# Patient Record
Sex: MALE | Race: WHITE | Employment: OTHER | URBAN - NONMETROPOLITAN AREA
[De-identification: names, ages, dates, MRNs, and addresses within clinical notes are randomized per-mention and may not be internally consistent; named-entity substitution may affect disease eponyms.]

---

## 2017-01-06 ENCOUNTER — HOSPITAL ENCOUNTER (OUTPATIENT)
Dept: GENERAL RADIOLOGY | Age: 67
Discharge: HOME | End: 2017-01-06
Payer: MEDICARE

## 2017-01-06 DIAGNOSIS — M79.671 RIGHT FOOT PAIN: ICD-10-CM

## 2017-01-06 PROCEDURE — 73630 X-RAY EXAM OF FOOT: CPT

## 2018-11-05 LAB
CHOLESTEROL, TOTAL: 183 MG/DL (ref 160–199)
HDLC SERPL-MCNC: 52 MG/DL (ref 55–121)
LDL CHOLESTEROL CALCULATED: 79 MG/DL
TRIGL SERPL-MCNC: 261 MG/DL (ref 0–149)

## 2019-03-07 ENCOUNTER — HOSPITAL ENCOUNTER (OUTPATIENT)
Dept: GENERAL RADIOLOGY | Age: 69
Discharge: HOME | End: 2019-03-07
Payer: MEDICARE

## 2019-03-07 DIAGNOSIS — M25.561 RIGHT KNEE PAIN, UNSPECIFIED CHRONICITY: ICD-10-CM

## 2019-03-07 PROCEDURE — 73562 X-RAY EXAM OF KNEE 3: CPT

## 2019-03-14 LAB
ALBUMIN SERPL-MCNC: 4.4 G/DL (ref 3.5–5.2)
ALP BLD-CCNC: 47 U/L (ref 40–130)
ALT SERPL-CCNC: 27 U/L (ref 5–41)
ANION GAP SERPL CALCULATED.3IONS-SCNC: 13 MMOL/L (ref 7–19)
AST SERPL-CCNC: 26 U/L (ref 5–40)
BASOPHILS ABSOLUTE: 0.1 K/UL (ref 0–0.2)
BASOPHILS RELATIVE PERCENT: 1.1 % (ref 0–1)
BILIRUB SERPL-MCNC: 0.8 MG/DL (ref 0.2–1.2)
BUN BLDV-MCNC: 14 MG/DL (ref 8–23)
CALCIUM SERPL-MCNC: 9.1 MG/DL (ref 8.8–10.2)
CHLORIDE BLD-SCNC: 101 MMOL/L (ref 98–111)
CHOLESTEROL, TOTAL: 177 MG/DL (ref 160–199)
CO2: 24 MMOL/L (ref 22–29)
CREAT SERPL-MCNC: 1.1 MG/DL (ref 0.5–1.2)
EOSINOPHILS ABSOLUTE: 0.1 K/UL (ref 0–0.6)
EOSINOPHILS RELATIVE PERCENT: 1.2 % (ref 0–5)
GFR NON-AFRICAN AMERICAN: >60
GLUCOSE BLD-MCNC: 92 MG/DL (ref 74–109)
HCT VFR BLD CALC: 44.8 % (ref 42–52)
HDLC SERPL-MCNC: 50 MG/DL (ref 55–121)
HEMOGLOBIN: 14.9 G/DL (ref 14–18)
LDL CHOLESTEROL CALCULATED: 74 MG/DL
LYMPHOCYTES ABSOLUTE: 3 K/UL (ref 1.1–4.5)
LYMPHOCYTES RELATIVE PERCENT: 36.4 % (ref 20–40)
MCH RBC QN AUTO: 30.3 PG (ref 27–31)
MCHC RBC AUTO-ENTMCNC: 33.3 G/DL (ref 33–37)
MCV RBC AUTO: 91.2 FL (ref 80–94)
MONOCYTES ABSOLUTE: 0.9 K/UL (ref 0–0.9)
MONOCYTES RELATIVE PERCENT: 11.2 % (ref 0–10)
NEUTROPHILS ABSOLUTE: 4.1 K/UL (ref 1.5–7.5)
NEUTROPHILS RELATIVE PERCENT: 49.5 % (ref 50–65)
PDW BLD-RTO: 13.2 % (ref 11.5–14.5)
PLATELET # BLD: 259 K/UL (ref 130–400)
PMV BLD AUTO: 9.8 FL (ref 9.4–12.4)
POTASSIUM SERPL-SCNC: 4.4 MMOL/L (ref 3.5–5)
PROSTATE SPECIFIC ANTIGEN: 0.38 NG/ML (ref 0–4)
RBC # BLD: 4.91 M/UL (ref 4.7–6.1)
SODIUM BLD-SCNC: 138 MMOL/L (ref 136–145)
TOTAL PROTEIN: 7.2 G/DL (ref 6.6–8.7)
TRIGL SERPL-MCNC: 266 MG/DL (ref 0–149)
TSH SERPL DL<=0.05 MIU/L-ACNC: 4.15 UIU/ML (ref 0.27–4.2)
WBC # BLD: 8.4 K/UL (ref 4.8–10.8)

## 2019-05-22 ENCOUNTER — HOSPITAL ENCOUNTER (OUTPATIENT)
Dept: VASCULAR LAB | Age: 69
Discharge: HOME | End: 2019-05-22
Payer: MEDICARE

## 2019-05-22 ENCOUNTER — HOSPITAL ENCOUNTER (OUTPATIENT)
Dept: MRI IMAGING | Age: 69
Discharge: HOME | End: 2019-05-22
Payer: MEDICARE

## 2019-05-22 DIAGNOSIS — R42 DIZZINESS: ICD-10-CM

## 2019-05-22 DIAGNOSIS — R42 DIZZINESS: Primary | ICD-10-CM

## 2019-05-22 DIAGNOSIS — R41.0 CONFUSION: ICD-10-CM

## 2019-05-22 LAB
GFR NON-AFRICAN AMERICAN: >60
PERFORMED ON: NORMAL
POC CREATININE: 1 MG/DL (ref 0.3–1.3)
POC SAMPLE TYPE: NORMAL

## 2019-05-22 PROCEDURE — 70553 MRI BRAIN STEM W/O & W/DYE: CPT

## 2019-05-22 PROCEDURE — A9577 INJ MULTIHANCE: HCPCS | Performed by: FAMILY MEDICINE

## 2019-05-22 PROCEDURE — 82565 ASSAY OF CREATININE: CPT

## 2019-05-22 PROCEDURE — 6360000004 HC RX CONTRAST MEDICATION: Performed by: FAMILY MEDICINE

## 2019-05-22 PROCEDURE — 93880 EXTRACRANIAL BILAT STUDY: CPT

## 2019-05-22 RX ADMIN — GADOBENATE DIMEGLUMINE 19 ML: 529 INJECTION, SOLUTION INTRAVENOUS at 14:46

## 2020-03-03 ENCOUNTER — CONSULT (OUTPATIENT)
Dept: ONCOLOGY | Facility: CLINIC | Age: 70
End: 2020-03-03

## 2020-03-03 ENCOUNTER — APPOINTMENT (OUTPATIENT)
Dept: LAB | Facility: HOSPITAL | Age: 70
End: 2020-03-03

## 2020-03-03 VITALS
HEART RATE: 77 BPM | OXYGEN SATURATION: 97 % | BODY MASS INDEX: 30.81 KG/M2 | RESPIRATION RATE: 18 BRPM | SYSTOLIC BLOOD PRESSURE: 146 MMHG | TEMPERATURE: 98 F | WEIGHT: 208 LBS | DIASTOLIC BLOOD PRESSURE: 78 MMHG | HEIGHT: 69 IN

## 2020-03-03 DIAGNOSIS — I15.9 SECONDARY HYPERTENSION: ICD-10-CM

## 2020-03-03 DIAGNOSIS — D04.61 SQUAMOUS CELL CARCINOMA IN SITU (SCCIS) OF SKIN OF RIGHT FOREARM: Primary | ICD-10-CM

## 2020-03-03 LAB
ALBUMIN SERPL-MCNC: 4.6 G/DL (ref 3.5–5.2)
ALBUMIN/GLOB SERPL: 1.5 G/DL
ALP SERPL-CCNC: 52 U/L (ref 39–117)
ALT SERPL W P-5'-P-CCNC: 21 U/L (ref 1–41)
ANION GAP SERPL CALCULATED.3IONS-SCNC: 14 MMOL/L (ref 5–15)
AST SERPL-CCNC: 25 U/L (ref 1–40)
BASOPHILS # BLD AUTO: 0.08 10*3/MM3 (ref 0–0.2)
BASOPHILS NFR BLD AUTO: 1.1 % (ref 0–1.5)
BILIRUB SERPL-MCNC: 0.5 MG/DL (ref 0.2–1.2)
BUN BLD-MCNC: 11 MG/DL (ref 8–23)
BUN/CREAT SERPL: 12.2 (ref 7–25)
CALCIUM SPEC-SCNC: 9.6 MG/DL (ref 8.6–10.5)
CHLORIDE SERPL-SCNC: 100 MMOL/L (ref 98–107)
CO2 SERPL-SCNC: 26 MMOL/L (ref 22–29)
CREAT BLD-MCNC: 0.9 MG/DL (ref 0.76–1.27)
DEPRECATED RDW RBC AUTO: 42.8 FL (ref 37–54)
EOSINOPHIL # BLD AUTO: 0.08 10*3/MM3 (ref 0–0.4)
EOSINOPHIL NFR BLD AUTO: 1.1 % (ref 0.3–6.2)
ERYTHROCYTE [DISTWIDTH] IN BLOOD BY AUTOMATED COUNT: 13.3 % (ref 12.3–15.4)
GFR SERPL CREATININE-BSD FRML MDRD: 83 ML/MIN/1.73
GLOBULIN UR ELPH-MCNC: 3 GM/DL
GLUCOSE BLD-MCNC: 100 MG/DL (ref 65–99)
HCT VFR BLD AUTO: 43 % (ref 37.5–51)
HGB BLD-MCNC: 15 G/DL (ref 13–17.7)
IMM GRANULOCYTES # BLD AUTO: 0.02 10*3/MM3 (ref 0–0.05)
IMM GRANULOCYTES NFR BLD AUTO: 0.3 % (ref 0–0.5)
LYMPHOCYTES # BLD AUTO: 2.99 10*3/MM3 (ref 0.7–3.1)
LYMPHOCYTES NFR BLD AUTO: 39.6 % (ref 19.6–45.3)
MCH RBC QN AUTO: 30.7 PG (ref 26.6–33)
MCHC RBC AUTO-ENTMCNC: 34.9 G/DL (ref 31.5–35.7)
MCV RBC AUTO: 87.9 FL (ref 79–97)
MONOCYTES # BLD AUTO: 0.91 10*3/MM3 (ref 0.1–0.9)
MONOCYTES NFR BLD AUTO: 12.1 % (ref 5–12)
NEUTROPHILS # BLD AUTO: 3.47 10*3/MM3 (ref 1.7–7)
NEUTROPHILS NFR BLD AUTO: 45.8 % (ref 42.7–76)
NRBC BLD AUTO-RTO: 0 /100 WBC (ref 0–0.2)
PLATELET # BLD AUTO: 265 10*3/MM3 (ref 140–450)
PMV BLD AUTO: 9.5 FL (ref 6–12)
POTASSIUM BLD-SCNC: 4.1 MMOL/L (ref 3.5–5.2)
PROT SERPL-MCNC: 7.6 G/DL (ref 6–8.5)
RBC # BLD AUTO: 4.89 10*6/MM3 (ref 4.14–5.8)
SODIUM BLD-SCNC: 140 MMOL/L (ref 136–145)
WBC NRBC COR # BLD: 7.55 10*3/MM3 (ref 3.4–10.8)

## 2020-03-03 PROCEDURE — 36415 COLL VENOUS BLD VENIPUNCTURE: CPT | Performed by: INTERNAL MEDICINE

## 2020-03-03 PROCEDURE — 85025 COMPLETE CBC W/AUTO DIFF WBC: CPT | Performed by: INTERNAL MEDICINE

## 2020-03-03 PROCEDURE — 80053 COMPREHEN METABOLIC PANEL: CPT | Performed by: INTERNAL MEDICINE

## 2020-03-03 PROCEDURE — 99204 OFFICE O/P NEW MOD 45 MIN: CPT | Performed by: INTERNAL MEDICINE

## 2020-04-20 RX ORDER — LISINOPRIL 5 MG/1
5 TABLET ORAL 2 TIMES DAILY
Status: ON HOLD | COMMUNITY
End: 2020-04-23

## 2020-04-23 ENCOUNTER — ANESTHESIA EVENT (OUTPATIENT)
Dept: PERIOP | Facility: HOSPITAL | Age: 70
End: 2020-04-23

## 2020-04-23 ENCOUNTER — HOSPITAL ENCOUNTER (OUTPATIENT)
Facility: HOSPITAL | Age: 70
Setting detail: HOSPITAL OUTPATIENT SURGERY
Discharge: HOME | End: 2020-04-23
Attending: SPECIALIST | Admitting: SPECIALIST

## 2020-04-23 ENCOUNTER — ANESTHESIA (OUTPATIENT)
Dept: PERIOP | Facility: HOSPITAL | Age: 70
End: 2020-04-23

## 2020-04-23 VITALS
TEMPERATURE: 97.9 F | RESPIRATION RATE: 20 BRPM | WEIGHT: 197.75 LBS | SYSTOLIC BLOOD PRESSURE: 150 MMHG | HEART RATE: 63 BPM | HEIGHT: 69 IN | BODY MASS INDEX: 29.29 KG/M2 | DIASTOLIC BLOOD PRESSURE: 65 MMHG | OXYGEN SATURATION: 98 %

## 2020-04-23 DIAGNOSIS — C44.622 SQUAMOUS CELL CANCER OF SKIN OF FOREARM, RIGHT: ICD-10-CM

## 2020-04-23 LAB
ALBUMIN SERPL-MCNC: 4.5 G/DL (ref 3.5–5.2)
ALBUMIN/GLOB SERPL: 1.7 G/DL
ALP SERPL-CCNC: 56 U/L (ref 39–117)
ALT SERPL W P-5'-P-CCNC: 15 U/L (ref 1–41)
ANION GAP SERPL CALCULATED.3IONS-SCNC: 12 MMOL/L (ref 5–15)
AST SERPL-CCNC: 18 U/L (ref 1–40)
BASOPHILS # BLD AUTO: 0.07 10*3/MM3 (ref 0–0.2)
BASOPHILS NFR BLD AUTO: 1.1 % (ref 0–1.5)
BILIRUB SERPL-MCNC: 0.9 MG/DL (ref 0.2–1.2)
BUN BLD-MCNC: 10 MG/DL (ref 8–23)
BUN/CREAT SERPL: 10.6 (ref 7–25)
CALCIUM SPEC-SCNC: 9.3 MG/DL (ref 8.6–10.5)
CHLORIDE SERPL-SCNC: 102 MMOL/L (ref 98–107)
CO2 SERPL-SCNC: 26 MMOL/L (ref 22–29)
CREAT BLD-MCNC: 0.94 MG/DL (ref 0.76–1.27)
DEPRECATED RDW RBC AUTO: 43.8 FL (ref 37–54)
EOSINOPHIL # BLD AUTO: 0.07 10*3/MM3 (ref 0–0.4)
EOSINOPHIL NFR BLD AUTO: 1.1 % (ref 0.3–6.2)
ERYTHROCYTE [DISTWIDTH] IN BLOOD BY AUTOMATED COUNT: 13.5 % (ref 12.3–15.4)
GFR SERPL CREATININE-BSD FRML MDRD: 79 ML/MIN/1.73
GLOBULIN UR ELPH-MCNC: 2.6 GM/DL
GLUCOSE BLD-MCNC: 96 MG/DL (ref 65–99)
HCT VFR BLD AUTO: 45 % (ref 37.5–51)
HGB BLD-MCNC: 15.3 G/DL (ref 13–17.7)
IMM GRANULOCYTES # BLD AUTO: 0.01 10*3/MM3 (ref 0–0.05)
IMM GRANULOCYTES NFR BLD AUTO: 0.2 % (ref 0–0.5)
LYMPHOCYTES # BLD AUTO: 2.1 10*3/MM3 (ref 0.7–3.1)
LYMPHOCYTES NFR BLD AUTO: 32.7 % (ref 19.6–45.3)
MCH RBC QN AUTO: 30.6 PG (ref 26.6–33)
MCHC RBC AUTO-ENTMCNC: 34 G/DL (ref 31.5–35.7)
MCV RBC AUTO: 90 FL (ref 79–97)
MONOCYTES # BLD AUTO: 0.83 10*3/MM3 (ref 0.1–0.9)
MONOCYTES NFR BLD AUTO: 12.9 % (ref 5–12)
NEUTROPHILS # BLD AUTO: 3.35 10*3/MM3 (ref 1.7–7)
NEUTROPHILS NFR BLD AUTO: 52 % (ref 42.7–76)
NRBC BLD AUTO-RTO: 0 /100 WBC (ref 0–0.2)
PLATELET # BLD AUTO: 233 10*3/MM3 (ref 140–450)
PMV BLD AUTO: 9.7 FL (ref 6–12)
POTASSIUM BLD-SCNC: 4.1 MMOL/L (ref 3.5–5.2)
PROT SERPL-MCNC: 7.1 G/DL (ref 6–8.5)
RBC # BLD AUTO: 5 10*6/MM3 (ref 4.14–5.8)
SODIUM BLD-SCNC: 140 MMOL/L (ref 136–145)
WBC NRBC COR # BLD: 6.43 10*3/MM3 (ref 3.4–10.8)

## 2020-04-23 PROCEDURE — 93010 ELECTROCARDIOGRAM REPORT: CPT | Performed by: INTERNAL MEDICINE

## 2020-04-23 PROCEDURE — 25010000002 ONDANSETRON PER 1 MG: Performed by: NURSE ANESTHETIST, CERTIFIED REGISTERED

## 2020-04-23 PROCEDURE — 25010000002 FENTANYL CITRATE (PF) 100 MCG/2ML SOLUTION: Performed by: NURSE ANESTHETIST, CERTIFIED REGISTERED

## 2020-04-23 PROCEDURE — 85025 COMPLETE CBC W/AUTO DIFF WBC: CPT | Performed by: SPECIALIST

## 2020-04-23 PROCEDURE — 88305 TISSUE EXAM BY PATHOLOGIST: CPT | Performed by: SPECIALIST

## 2020-04-23 PROCEDURE — 25010000002 DEXAMETHASONE PER 1 MG: Performed by: ANESTHESIOLOGY

## 2020-04-23 PROCEDURE — 93005 ELECTROCARDIOGRAM TRACING: CPT | Performed by: SPECIALIST

## 2020-04-23 PROCEDURE — 25010000002 PROPOFOL 10 MG/ML EMULSION: Performed by: NURSE ANESTHETIST, CERTIFIED REGISTERED

## 2020-04-23 PROCEDURE — 80053 COMPREHEN METABOLIC PANEL: CPT | Performed by: SPECIALIST

## 2020-04-23 RX ORDER — SODIUM CHLORIDE, SODIUM LACTATE, POTASSIUM CHLORIDE, CALCIUM CHLORIDE 600; 310; 30; 20 MG/100ML; MG/100ML; MG/100ML; MG/100ML
100 INJECTION, SOLUTION INTRAVENOUS CONTINUOUS
Status: DISCONTINUED | OUTPATIENT
Start: 2020-04-23 | End: 2020-04-23 | Stop reason: HOSPADM

## 2020-04-23 RX ORDER — LABETALOL HYDROCHLORIDE 5 MG/ML
5 INJECTION, SOLUTION INTRAVENOUS
Status: CANCELLED | OUTPATIENT
Start: 2020-04-23

## 2020-04-23 RX ORDER — ACETAMINOPHEN 500 MG
1000 TABLET ORAL ONCE
Status: COMPLETED | OUTPATIENT
Start: 2020-04-23 | End: 2020-04-23

## 2020-04-23 RX ORDER — SODIUM CHLORIDE 0.9 % (FLUSH) 0.9 %
3 SYRINGE (ML) INJECTION EVERY 12 HOURS SCHEDULED
Status: DISCONTINUED | OUTPATIENT
Start: 2020-04-23 | End: 2020-04-23 | Stop reason: HOSPADM

## 2020-04-23 RX ORDER — OXYCODONE HYDROCHLORIDE AND ACETAMINOPHEN 5; 325 MG/1; MG/1
1-2 TABLET ORAL EVERY 4 HOURS PRN
Qty: 30 TABLET | Refills: 0 | Status: SHIPPED | OUTPATIENT
Start: 2020-04-23

## 2020-04-23 RX ORDER — FLUMAZENIL 0.1 MG/ML
0.2 INJECTION INTRAVENOUS AS NEEDED
Status: CANCELLED | OUTPATIENT
Start: 2020-04-23

## 2020-04-23 RX ORDER — OXYCODONE AND ACETAMINOPHEN 7.5; 325 MG/1; MG/1
1 TABLET ORAL EVERY 4 HOURS PRN
Status: CANCELLED | OUTPATIENT
Start: 2020-04-23 | End: 2020-05-03

## 2020-04-23 RX ORDER — ONDANSETRON 2 MG/ML
4 INJECTION INTRAMUSCULAR; INTRAVENOUS ONCE AS NEEDED
Status: CANCELLED | OUTPATIENT
Start: 2020-04-23

## 2020-04-23 RX ORDER — LIDOCAINE HYDROCHLORIDE 10 MG/ML
0.5 INJECTION, SOLUTION EPIDURAL; INFILTRATION; INTRACAUDAL; PERINEURAL ONCE AS NEEDED
Status: DISCONTINUED | OUTPATIENT
Start: 2020-04-23 | End: 2020-04-23 | Stop reason: HOSPADM

## 2020-04-23 RX ORDER — OXYCODONE HYDROCHLORIDE AND ACETAMINOPHEN 5; 325 MG/1; MG/1
1 TABLET ORAL ONCE AS NEEDED
Status: CANCELLED | OUTPATIENT
Start: 2020-04-23

## 2020-04-23 RX ORDER — DEXAMETHASONE SODIUM PHOSPHATE 4 MG/ML
4 INJECTION, SOLUTION INTRA-ARTICULAR; INTRALESIONAL; INTRAMUSCULAR; INTRAVENOUS; SOFT TISSUE ONCE AS NEEDED
Status: COMPLETED | OUTPATIENT
Start: 2020-04-23 | End: 2020-04-23

## 2020-04-23 RX ORDER — FENTANYL CITRATE 50 UG/ML
INJECTION, SOLUTION INTRAMUSCULAR; INTRAVENOUS AS NEEDED
Status: DISCONTINUED | OUTPATIENT
Start: 2020-04-23 | End: 2020-04-23 | Stop reason: SURG

## 2020-04-23 RX ORDER — SODIUM CHLORIDE 0.9 % (FLUSH) 0.9 %
3 SYRINGE (ML) INJECTION AS NEEDED
Status: DISCONTINUED | OUTPATIENT
Start: 2020-04-23 | End: 2020-04-23 | Stop reason: HOSPADM

## 2020-04-23 RX ORDER — BUPIVACAINE HYDROCHLORIDE AND EPINEPHRINE 2.5; 5 MG/ML; UG/ML
INJECTION, SOLUTION INFILTRATION; PERINEURAL AS NEEDED
Status: DISCONTINUED | OUTPATIENT
Start: 2020-04-23 | End: 2020-04-23 | Stop reason: HOSPADM

## 2020-04-23 RX ORDER — FENTANYL CITRATE 50 UG/ML
25 INJECTION, SOLUTION INTRAMUSCULAR; INTRAVENOUS
Status: CANCELLED | OUTPATIENT
Start: 2020-04-23

## 2020-04-23 RX ORDER — PROPOFOL 10 MG/ML
VIAL (ML) INTRAVENOUS AS NEEDED
Status: DISCONTINUED | OUTPATIENT
Start: 2020-04-23 | End: 2020-04-23 | Stop reason: SURG

## 2020-04-23 RX ORDER — SODIUM CHLORIDE, SODIUM LACTATE, POTASSIUM CHLORIDE, CALCIUM CHLORIDE 600; 310; 30; 20 MG/100ML; MG/100ML; MG/100ML; MG/100ML
1000 INJECTION, SOLUTION INTRAVENOUS CONTINUOUS
Status: DISCONTINUED | OUTPATIENT
Start: 2020-04-23 | End: 2020-04-23 | Stop reason: HOSPADM

## 2020-04-23 RX ORDER — MAGNESIUM HYDROXIDE 1200 MG/15ML
LIQUID ORAL AS NEEDED
Status: DISCONTINUED | OUTPATIENT
Start: 2020-04-23 | End: 2020-04-23 | Stop reason: HOSPADM

## 2020-04-23 RX ORDER — NALOXONE HCL 0.4 MG/ML
0.4 VIAL (ML) INJECTION AS NEEDED
Status: CANCELLED | OUTPATIENT
Start: 2020-04-23

## 2020-04-23 RX ORDER — OXYCODONE HYDROCHLORIDE AND ACETAMINOPHEN 5; 325 MG/1; MG/1
1 TABLET ORAL ONCE AS NEEDED
Status: DISCONTINUED | OUTPATIENT
Start: 2020-04-23 | End: 2020-04-23 | Stop reason: HOSPADM

## 2020-04-23 RX ORDER — ONDANSETRON 2 MG/ML
INJECTION INTRAMUSCULAR; INTRAVENOUS AS NEEDED
Status: DISCONTINUED | OUTPATIENT
Start: 2020-04-23 | End: 2020-04-23 | Stop reason: SURG

## 2020-04-23 RX ORDER — SODIUM CHLORIDE 0.9 % (FLUSH) 0.9 %
3-10 SYRINGE (ML) INJECTION AS NEEDED
Status: DISCONTINUED | OUTPATIENT
Start: 2020-04-23 | End: 2020-04-23 | Stop reason: HOSPADM

## 2020-04-23 RX ADMIN — ACETAMINOPHEN 1000 MG: 500 TABLET, FILM COATED ORAL at 11:07

## 2020-04-23 RX ADMIN — FENTANYL CITRATE 50 MCG: 50 INJECTION, SOLUTION INTRAMUSCULAR; INTRAVENOUS at 11:31

## 2020-04-23 RX ADMIN — LIDOCAINE HYDROCHLORIDE 100 MG: 20 INJECTION, SOLUTION INTRAVENOUS at 11:32

## 2020-04-23 RX ADMIN — SODIUM CHLORIDE, POTASSIUM CHLORIDE, SODIUM LACTATE AND CALCIUM CHLORIDE 1000 ML: 600; 310; 30; 20 INJECTION, SOLUTION INTRAVENOUS at 07:39

## 2020-04-23 RX ADMIN — PROPOFOL 260 MG: 10 INJECTION, EMULSION INTRAVENOUS at 11:32

## 2020-04-23 RX ADMIN — ONDANSETRON HYDROCHLORIDE 4 MG: 2 SOLUTION INTRAMUSCULAR; INTRAVENOUS at 11:45

## 2020-04-23 RX ADMIN — CEFAZOLIN 1 G: 1 INJECTION, POWDER, FOR SOLUTION INTRAMUSCULAR; INTRAVENOUS; PARENTERAL at 11:35

## 2020-04-23 RX ADMIN — FENTANYL CITRATE 50 MCG: 50 INJECTION, SOLUTION INTRAMUSCULAR; INTRAVENOUS at 11:41

## 2020-04-23 RX ADMIN — DEXAMETHASONE SODIUM PHOSPHATE 4 MG: 4 INJECTION, SOLUTION INTRAMUSCULAR; INTRAVENOUS at 11:07

## 2020-04-23 NOTE — DISCHARGE INSTRUCTIONS
YOUR NEXT PAIN MEDICATION IS DUE AT______________        Moderate Conscious Sedation, Adult, Care After  Refer to this sheet in the next few weeks. These instructions provide you with information on caring for yourself after your procedure. Your health care provider may also give you more specific instructions. Your treatment has been planned according to current medical practices, but problems sometimes occur. Call your health care provider if you have any problems or questions after your procedure.  WHAT TO EXPECT AFTER THE PROCEDURE    After your procedure:  · You may feel sleepy, clumsy, and have poor balance for several hours.  · Vomiting may occur if you eat too soon after the procedure.  HOME CARE INSTRUCTIONS  · Do not participate in any activities where you could become injured for at least 24 hours. Do not:  ¨ Drive.  ¨ Swim.  ¨ Ride a bicycle.  ¨ Operate heavy machinery.  ¨ Cook.  ¨ Use power tools.  ¨ Climb ladders.  ¨ Work from a high place.  · Do not make important decisions or sign legal documents until you are improved.  · If you vomit, drink water, juice, or soup when you can drink without vomiting. Make sure you have little or no nausea before eating solid foods.  · Only take over-the-counter or prescription medicines for pain, discomfort, or fever as directed by your health care provider.  · Make sure you and your family fully understand everything about the medicines given to you, including what side effects may occur.  · You should not drink alcohol, take sleeping pills, or take medicines that cause drowsiness for at least 24 hours.  · If you smoke, do not smoke without supervision.  · If you are feeling better, you may resume normal activities 24 hours after you were sedated.  · Keep all appointments with your health care provider.  SEEK MEDICAL CARE IF:  · Your skin is pale or bluish in color.  · You continue to feel nauseous or vomit.  · Your pain is getting worse and is not helped by  medicine.  · You have bleeding or swelling.  · You are still sleepy or feeling clumsy after 24 hours.  SEEK IMMEDIATE MEDICAL CARE IF:  · You develop a rash.  · You have difficulty breathing.  · You develop any type of allergic problem.  · You have a fever.  MAKE SURE YOU:  · Understand these instructions.  · Will watch your condition.  · Will get help right away if you are not doing well or get worse.     This information is not intended to replace advice given to you by your health care provider. Make sure you discuss any questions you have with your health care provider.     Document Released: 10/08/2014 Document Revised: 01/08/2016 Document Reviewed: 10/08/2014  [employer] Interactive Patient Education ©2016 [employer]         CALL YOUR PHYSICIAN IF YOU EXPERIENCE  INCREASED PAIN NOT HELPED BY YOUR PAIN MEDICATION.        Fall Prevention in the Home      Falls can cause injuries. They can happen to people of all ages. There are many things you can do to make your home safe and to help prevent falls.    WHAT CAN I DO ON THE OUTSIDE OF MY HOME?  · Regularly fix the edges of walkways and driveways and fix any cracks.  · Remove anything that might make you trip as you walk through a door, such as a raised step or threshold.  · Trim any bushes or trees on the path to your home.  · Use bright outdoor lighting.  · Clear any walking paths of anything that might make someone trip, such as rocks or tools.  · Regularly check to see if handrails are loose or broken. Make sure that both sides of any steps have handrails.  · Any raised decks and porches should have guardrails on the edges.  · Have any leaves, snow, or ice cleared regularly.  · Use sand or salt on walking paths during winter.  · Clean up any spills in your garage right away. This includes oil or grease spills.  WHAT CAN I DO IN THE BATHROOM?    · Use night lights.  · Install grab bars by the toilet and in the tub and shower. Do not use towel bars as grab  bars.  · Use non-skid mats or decals in the tub or shower.  · If you need to sit down in the shower, use a plastic, non-slip stool.  · Keep the floor dry. Clean up any water that spills on the floor as soon as it happens.  · Remove soap buildup in the tub or shower regularly.  · Attach bath mats securely with double-sided non-slip rug tape.  · Do not have throw rugs and other things on the floor that can make you trip.  WHAT CAN I DO IN THE BEDROOM?  · Use night lights.  · Make sure that you have a light by your bed that is easy to reach.  · Do not use any sheets or blankets that are too big for your bed. They should not hang down onto the floor.  · Have a firm chair that has side arms. You can use this for support while you get dressed.  · Do not have throw rugs and other things on the floor that can make you trip.  WHAT CAN I DO IN THE KITCHEN?  · Clean up any spills right away.  · Avoid walking on wet floors.  · Keep items that you use a lot in easy-to-reach places.  · If you need to reach something above you, use a strong step stool that has a grab bar.  · Keep electrical cords out of the way.  · Do not use floor polish or wax that makes floors slippery. If you must use wax, use non-skid floor wax.  · Do not have throw rugs and other things on the floor that can make you trip.  WHAT CAN I DO WITH MY STAIRS?  · Do not leave any items on the stairs.  · Make sure that there are handrails on both sides of the stairs and use them. Fix handrails that are broken or loose. Make sure that handrails are as long as the stairways.  · Check any carpeting to make sure that it is firmly attached to the stairs. Fix any carpet that is loose or worn.  · Avoid having throw rugs at the top or bottom of the stairs. If you do have throw rugs, attach them to the floor with carpet tape.  · Make sure that you have a light switch at the top of the stairs and the bottom of the stairs. If you do not have them, ask someone to add them for  you.  WHAT ELSE CAN I DO TO HELP PREVENT FALLS?  · Wear shoes that:  ¨ Do not have high heels.  ¨ Have rubber bottoms.  ¨ Are comfortable and fit you well.  ¨ Are closed at the toe. Do not wear sandals.  · If you use a stepladder:  ¨ Make sure that it is fully opened. Do not climb a closed stepladder.  ¨ Make sure that both sides of the stepladder are locked into place.  ¨ Ask someone to hold it for you, if possible.  · Clearly jeferson and make sure that you can see:  ¨ Any grab bars or handrails.  ¨ First and last steps.  ¨ Where the edge of each step is.  · Use tools that help you move around (mobility aids) if they are needed. These include:  ¨ Canes.  ¨ Walkers.  ¨ Scooters.  ¨ Crutches.  · Turn on the lights when you go into a dark area. Replace any light bulbs as soon as they burn out.  · Set up your furniture so you have a clear path. Avoid moving your furniture around.  · If any of your floors are uneven, fix them.  · If there are any pets around you, be aware of where they are.  · Review your medicines with your doctor. Some medicines can make you feel dizzy. This can increase your chance of falling.  Ask your doctor what other things that you can do to help prevent falls.     This information is not intended to replace advice given to you by your health care provider. Make sure you discuss any questions you have with your health care provider.     Document Released: 10/14/2010 Document Revised: 05/03/2016 Document Reviewed: 01/22/2016  Elsevier Interactive Patient Education ©2016 [employer]     PATIENT/FAMILY/RESPONSIBLE PARTY VERBALIZES UNDERSTANDING OF ABOVE EDUCATION.  COPY OF PAIN SCALE GIVEN AND REVIEWED WITH VERBALIZED UNDERSTANDING.

## 2020-04-23 NOTE — H&P
[facility] Physical     Referring Provider: Kami Palmer MD    Patient Care Team:  Aquiles Little MD as PCP - General  Aquiles Little MD as PCP - Family Medicine    Chief complaint skin cancer    Subjective .     History of present illness:  The patient is a 70 y.o. male who is status post excision of a skin lesion at his right arm by his dermatologist.  Pathology demonstrated squamous cell carcinoma.  The margins were clear, but close..    Review of Systems    Review of Systems - General ROS: negative  ENT ROS: negative  Respiratory ROS: no cough, shortness of breath, or wheezing  Cardiovascular ROS: no chest pain or dyspnea on exertion  Gastrointestinal ROS: no abdominal pain, change in bowel habits, or black or bloody stools  Genito-Urinary ROS: no dysuria, trouble voiding, or hematuria  Dermatological ROS: negative   Breast ROS: negative for breast lumps  Hematological and Lymphatic ROS: negative  Musculoskeletal ROS: negative   Neurological ROS: no TIA or stroke symptoms    Psychological ROS: negative  Endocrine ROS: negative    History  Past Medical History:   Diagnosis Date   • Cancer (CMS/HCC)     skin cancer    • Hypertension    ,   Past Surgical History:   Procedure Laterality Date   • COLONOSCOPY     , History reviewed. No pertinent family history.,   Social History     Tobacco Use   • Smoking status: Never Smoker   • Smokeless tobacco: Never Used   Substance Use Topics   • Alcohol use: Yes     Alcohol/week: 1.0 standard drinks     Types: 1 Cans of beer per week     Comment: beer   • Drug use: Never   ,   Medications Prior to Admission   Medication Sig Dispense Refill Last Dose   • lisinopril (PRINIVIL,ZESTRIL) 5 MG tablet Take 5 mg by mouth 2 (Two) Times a Day.       and Allergies:  Patient has no known allergies.  No current facility-administered medications for this encounter.     Objective     Vital Signs        Physical Exam:  General appearance - alert, well  appearing, and in no distress  Mental status - alert, oriented to person, place, and time  Neck - supple, no significant adenopathy  Chest - clear to auscultation, no wheezes, rales or rhonchi, symmetric air entry  Heart - normal rate, regular rhythm, normal S1, S2, no murmurs, rubs, clicks or gallops  Abdomen - soft, nontender, nondistended, no masses or organomegaly  Neurological - alert, oriented, normal speech, no focal findings or movement disorder noted  Musculoskeletal - no joint tenderness, deformity or swelling  Extremities - peripheral pulses normal, no pedal edema, no clubbing or cyanosis    Results Review:     Lab Results (last 24 hours)     ** No results found for the last 24 hours. **        Imaging Results (Last 24 Hours)     ** No results found for the last 24 hours. **            Assessment/Plan       Squamous cell carcinoma right arm.  He will undergo wide local re-excision of the squamous cell carcinoma of the right arm.  The risks, benefits, complications, and possible alternatives were discussed with the patient who agreed to proceed.      Kami Palmer MD  04/23/20  06:53

## 2020-04-23 NOTE — ANESTHESIA POSTPROCEDURE EVALUATION
"Patient: Barry Palmer    Procedure Summary     Date:  04/23/20 Room / Location:  [facility] OR  /  [facility] OR    Anesthesia Start:  1129 Anesthesia Stop:  1210    Procedure:  WIDE LOCAL EXCISION SQUAMOUS CELL CARCINOMA - RIGHT UPPER EXTREMITY (Right ) Diagnosis:  (SQUAMOUS CELL CARCINOMA)    Surgeon:  Kami Palmer MD Provider:  Erin Griggs CRNA    Anesthesia Type:  general ASA Status:  2          Anesthesia Type: general    Vitals  No vitals data found for the desired time range.          Post Anesthesia Care and Evaluation    Patient location during evaluation: PHASE II  Patient participation: complete - patient participated  Level of consciousness: awake and alert  Pain management: adequate  Airway patency: patent  Anesthetic complications: No anesthetic complications    Cardiovascular status: acceptable  Respiratory status: acceptable  Hydration status: acceptable    Comments: Blood pressure 138/94, pulse 61, temperature 97.1 °F (36.2 °C), temperature source Temporal, resp. rate 18, height 176 cm (69.29\"), weight 89.7 kg (197 lb 12 oz), SpO2 99 %.    Pt discharged from PACU based on anum score >8      "

## 2020-04-23 NOTE — ANESTHESIA PREPROCEDURE EVALUATION
Anesthesia Evaluation     Patient summary reviewed   no history of anesthetic complications:  NPO Solid Status: > 8 hours  NPO Liquid Status: > 8 hours           Airway   Mallampati: II  TM distance: >3 FB  Neck ROM: full  Dental - normal exam         Pulmonary - normal exam    breath sounds clear to auscultation  (-) asthma, recent URI, sleep apnea, not a smoker  Cardiovascular - normal exam  Exercise tolerance: good (4-7 METS)    ECG reviewed  Rhythm: regular  Rate: normal    (+) hypertension,   (-) pacemaker, past MI, angina, cardiac stents      Neuro/Psych  (-) seizures, TIA, CVA  GI/Hepatic/Renal/Endo    (-) GERD, liver disease, no renal disease, diabetes, no thyroid disorder    Musculoskeletal     Abdominal    Substance History      OB/GYN          Other                      Anesthesia Plan    ASA 2     MAC     intravenous induction     Anesthetic plan, all risks, benefits, and alternatives have been provided, discussed and informed consent has been obtained with: patient.

## 2020-04-24 LAB
CYTO UR: NORMAL
LAB AP CASE REPORT: NORMAL
PATH REPORT.FINAL DX SPEC: NORMAL
PATH REPORT.GROSS SPEC: NORMAL

## 2020-04-24 NOTE — OP NOTE
Preoperative diagnosis:  Squamous cell carcinoma right arm  Postoperative diagnosis:  Same  Procedure:  Wide local re-excision squamous cell carcinoma right arm  Surgeon: Kami Palmer MD  Anesthesia:  Mac loc  Ebl:  Minimal  Ivf:  See anesthesia  Indications; the patient is a 70-year-old male who is status post excision of skin lesion at right arm by his dermatologist.  Pathology demonstrated squamous cell carcinoma.  He presents for wide local re-excision for wider margins.  The risks, benefits, complications, and possible alternatives were discussed with the patient who agreed to proceed.  Description of procedure: the patient was laid supine. The right arm was prepped and draped.  An elliptical incision was created around the previous scar with 1cm margins.  bovie was used to dissect the subcutaneous tissues to the deep fascia of the underlying muscle.  The specimen was marked with short stitch superiorly and long laterally.  The deep fascia was reapproximated with 2-0 vicryl.  The skin was closed with 3-0 and 4-0 vicryl. The sponge, needle, and instrument counts were correct.  Complications:none  Disposition: good to pacu  Findings:  3 x 6 cm specimen

## 2022-09-26 LAB — URIC ACID, SERUM: 5.9 MG/DL (ref 3.4–7)

## 2024-06-19 ENCOUNTER — OFFICE VISIT (OUTPATIENT)
Dept: INTERNAL MEDICINE | Facility: CLINIC | Age: 74
End: 2024-06-19
Payer: MEDICARE

## 2024-06-19 VITALS
TEMPERATURE: 97.8 F | OXYGEN SATURATION: 99 % | BODY MASS INDEX: 29.03 KG/M2 | SYSTOLIC BLOOD PRESSURE: 138 MMHG | WEIGHT: 196 LBS | HEIGHT: 69 IN | HEART RATE: 84 BPM | DIASTOLIC BLOOD PRESSURE: 60 MMHG

## 2024-06-19 DIAGNOSIS — Z00.00 WELLNESS EXAMINATION: ICD-10-CM

## 2024-06-19 DIAGNOSIS — E78.2 MIXED HYPERLIPIDEMIA: ICD-10-CM

## 2024-06-19 DIAGNOSIS — Z12.5 PROSTATE CANCER SCREENING: ICD-10-CM

## 2024-06-19 DIAGNOSIS — Z86.010 PERSONAL HISTORY OF COLONIC POLYPS: ICD-10-CM

## 2024-06-19 DIAGNOSIS — Z76.89 ENCOUNTER TO ESTABLISH CARE WITH NEW DOCTOR: Primary | ICD-10-CM

## 2024-06-19 DIAGNOSIS — I10 ESSENTIAL HYPERTENSION: ICD-10-CM

## 2024-06-19 DIAGNOSIS — Z11.59 ENCOUNTER FOR HEPATITIS C SCREENING TEST FOR LOW RISK PATIENT: ICD-10-CM

## 2024-06-19 DIAGNOSIS — E66.3 OVERWEIGHT (BMI 25.0-29.9): ICD-10-CM

## 2024-06-19 DIAGNOSIS — R41.3 MEMORY IMPAIRMENT: ICD-10-CM

## 2024-06-19 PROBLEM — Z86.0100 PERSONAL HISTORY OF COLONIC POLYPS: Status: ACTIVE | Noted: 2024-06-19

## 2024-06-19 PROCEDURE — G0009 ADMIN PNEUMOCOCCAL VACCINE: HCPCS | Performed by: INTERNAL MEDICINE

## 2024-06-19 PROCEDURE — 90677 PCV20 VACCINE IM: CPT | Performed by: INTERNAL MEDICINE

## 2024-06-19 PROCEDURE — 1159F MED LIST DOCD IN RCRD: CPT | Performed by: INTERNAL MEDICINE

## 2024-06-19 PROCEDURE — 1126F AMNT PAIN NOTED NONE PRSNT: CPT | Performed by: INTERNAL MEDICINE

## 2024-06-19 PROCEDURE — 3078F DIAST BP <80 MM HG: CPT | Performed by: INTERNAL MEDICINE

## 2024-06-19 PROCEDURE — 99204 OFFICE O/P NEW MOD 45 MIN: CPT | Performed by: INTERNAL MEDICINE

## 2024-06-19 PROCEDURE — 3075F SYST BP GE 130 - 139MM HG: CPT | Performed by: INTERNAL MEDICINE

## 2024-06-19 PROCEDURE — 1160F RVW MEDS BY RX/DR IN RCRD: CPT | Performed by: INTERNAL MEDICINE

## 2024-06-19 RX ORDER — DONEPEZIL HYDROCHLORIDE 10 MG/1
10 TABLET, FILM COATED ORAL NIGHTLY
COMMUNITY

## 2024-06-19 RX ORDER — MULTIPLE VITAMINS W/ MINERALS TAB 9MG-400MCG
1 TAB ORAL DAILY
COMMUNITY

## 2024-06-19 RX ORDER — SIMVASTATIN 40 MG
40 TABLET ORAL NIGHTLY
COMMUNITY

## 2024-06-19 NOTE — PROGRESS NOTES
"    Chief Complaint  Establish Care (Memory loss; comes and goes; ) and Discuss pneum vaccine    Subjective        Barry Palmer presents to [facility]  History of Present Illness  See below.     Objective   Vital Signs:  /60 (BP Location: Left arm, Patient Position: Sitting, Cuff Size: Adult)   Pulse 84   Temp 97.8 °F (36.6 °C) (Temporal)   Ht 176 cm (69.29\")   Wt 88.9 kg (196 lb)   SpO2 99%   BMI 28.70 kg/m²   Estimated body mass index is 28.7 kg/m² as calculated from the following:    Height as of this encounter: 176 cm (69.29\").    Weight as of this encounter: 88.9 kg (196 lb).     BMI is >= 25 and <30. (Overweight) The following options were offered after discussion;: weight loss educational material (shared in after visit summary)    Physical Exam  Constitutional:       Comments: Seen and discussed with his wife.   HENT:      Head: Normocephalic and atraumatic.   Eyes:      Conjunctiva/sclera: Conjunctivae normal.      Pupils: Pupils are equal, round, and reactive to light.   Cardiovascular:      Rate and Rhythm: Normal rate and regular rhythm.      Heart sounds: Normal heart sounds.   Pulmonary:      Effort: Pulmonary effort is normal. No respiratory distress.      Breath sounds: Normal breath sounds.   Musculoskeletal:         General: No swelling.      Cervical back: Neck supple.   Skin:     General: Skin is warm and dry.      Findings: No rash.      Comments: Actinic changes and previous liquid nitrogen treatments to the bilateral upper extremities.  He has a history of squamous cell carcinoma of the right upper extremity that required excision by Dr. Kami Palmer in 2020.   Neurological:      General: No focal deficit present.      Mental Status: He is alert and oriented to person, place, and time.      Comments: I do not detect any cognitive impairment.  I spent close to 40 minutes with the patient and his wife.   Psychiatric:         Mood and Affect: Mood " normal.         Behavior: Behavior normal.         Thought Content: Thought content normal.         Judgment: Judgment normal.        Result Review :  MRI of the brain with and without contrast in May 2019 at [facility] showed age-related changes with atrophy and no acute intracranial abnormality seen.    Reviewed pathology from his colonoscopy in September 2014.  Hyperplastic polyp removed.    Dr. Little did not send the chart.  The patient thinks that he has not had labs in either close to or over a year.         Assessment and Plan   Diagnoses and all orders for this visit:    1. Encounter to establish care with new doctor (Primary)    2. Memory impairment  -     TSH Rfx On Abnormal To Free T4  -     Vitamin B12  -     Folate  -     RPR    3. Overweight (BMI 25.0-29.9)    4. Essential hypertension    5. Mixed hyperlipidemia  -     Lipid Panel    6. Wellness examination  -     CBC & Differential  -     Comprehensive metabolic panel    7. Personal history of colonic polyps    8. Prostate cancer screening  -     PSA SCREENING    9. Encounter for hepatitis C screening test for low risk patient  -     Hepatitis C antibody    Other orders  -     Pneumococcal Conjugate Vaccine 20-Valent (PCV20)       Presents today to establish care.  He has been a long-term patient of Dr. Little who is now retired.  His wife is present with him today and she established care with me in 2 weeks as well.    The patient and his wife's main concern is that he has had problems with mild memory loss over the last few years.  They for started noticing this in earnest around 2019.  He underwent MRI of the brain with and without contrast in May 2019 at [facility].  This showed age-related changes with atrophy and no acute intracranial abnormality seen.  Unclear if he had any laboratory testing.  He was never evaluated by neurology.  Dr. Little placed him on donepezil.  The patient feels like he may have had some improvements with donepezil and his wife agrees.   They discussed his memory loss and being difficulty with short-term.  They state that he gets mixed up with words frequently.  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  He is still able to help with the business part-time.  He does not make mistakes with keeping books or conducting business.  He still functions at a very high level.  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  He never gets lost.  He states that he does not have great concern for this issue.  We talked about potential neurology referral, but he declines at this point in time.  I would like to go ahead and check thyroid function, vitamin B12/folate, RPR.  He is agreeable.  He asked about stopping donepezil, but I would continue it for the time being.  No neurologic deficits or frontal release signs on exam today.  No difficulty with incontinence.  Doubt any movement disorders or NPH.    He has intentionally lost 30-40 pounds over the last 4-5 years.  He has done this by watching his diet and getting more exercise after retiring.    He has gradually reduced his blood pressure medications over the last few years.  Blood pressure 130/60 in the office today.  Currently on 10 mg of ramipril daily where at one point he had been on 20 mg twice daily.  Continue the same for now.    He has had hyperlipidemia issues.  He is on simvastatin 40 mg nightly.  He admits that he has been taking this every other day for several months.  He has not had side effects.  We will reassess lipid panel and see where we are at with that.    It appears that his last colonoscopy was in September 2014 with Dr. Joe.  A hyperplastic polyp was removed.  He is overdue for continued screening.  I sent a secure chat to Dr. Joe and his nurse practitioner, Kerri Sierra.  We will see about getting him in for a new screening colonoscopy.   The patient is not having any problems with bowel habits or noticing any bleeding.      Sounds overdue for PSA, which will be checked today.    Receives vision care from Dr. Harris and Dr. Zapata.  Sees Dr. Nguyen for dentistry.    He accepted the PCV 20 vaccine today.    Plan to have him back in 6 months for his annual Medicare wellness visit or sooner if problems.  He and his wife know that they can reach out sooner if needed.      Follow Up   Return in about 6 months (around 12/19/2024) for Medicare Wellness.  Patient was given instructions and counseling regarding his condition or for health maintenance advice. Please see specific information pulled into the AVS if appropriate.      SARAH Goodson DO       Electronically signed by DOUG Goodson DO, 06/19/24, 1:22 PM CDT.

## 2024-06-20 DIAGNOSIS — E78.2 MIXED HYPERLIPIDEMIA: Primary | ICD-10-CM

## 2024-06-20 LAB
ALBUMIN SERPL-MCNC: 4.3 G/DL (ref 3.5–5.2)
ALBUMIN/GLOB SERPL: 2 G/DL
ALP SERPL-CCNC: 56 U/L (ref 39–117)
ALT SERPL-CCNC: 19 U/L (ref 1–41)
AST SERPL-CCNC: 22 U/L (ref 1–40)
BASOPHILS # BLD AUTO: 0.06 10*3/MM3 (ref 0–0.2)
BASOPHILS NFR BLD AUTO: 1.2 % (ref 0–1.5)
BILIRUB SERPL-MCNC: 0.5 MG/DL (ref 0–1.2)
BUN SERPL-MCNC: 13 MG/DL (ref 8–23)
BUN/CREAT SERPL: 13.7 (ref 7–25)
CALCIUM SERPL-MCNC: 9.1 MG/DL (ref 8.6–10.5)
CHLORIDE SERPL-SCNC: 104 MMOL/L (ref 98–107)
CHOLEST SERPL-MCNC: 177 MG/DL (ref 0–200)
CO2 SERPL-SCNC: 23.9 MMOL/L (ref 22–29)
CREAT SERPL-MCNC: 0.95 MG/DL (ref 0.76–1.27)
EGFRCR SERPLBLD CKD-EPI 2021: 83 ML/MIN/1.73
EOSINOPHIL # BLD AUTO: 0.03 10*3/MM3 (ref 0–0.4)
EOSINOPHIL NFR BLD AUTO: 0.6 % (ref 0.3–6.2)
ERYTHROCYTE [DISTWIDTH] IN BLOOD BY AUTOMATED COUNT: 12.8 % (ref 12.3–15.4)
FOLATE SERPL-MCNC: >20 NG/ML (ref 4.78–24.2)
GLOBULIN SER CALC-MCNC: 2.1 GM/DL
GLUCOSE SERPL-MCNC: 99 MG/DL (ref 65–99)
HCT VFR BLD AUTO: 43.8 % (ref 37.5–51)
HCV IGG SERPL QL IA: NON REACTIVE
HDLC SERPL-MCNC: 57 MG/DL (ref 40–60)
HGB BLD-MCNC: 14.8 G/DL (ref 13–17.7)
IMM GRANULOCYTES # BLD AUTO: 0.01 10*3/MM3 (ref 0–0.05)
IMM GRANULOCYTES NFR BLD AUTO: 0.2 % (ref 0–0.5)
LDLC SERPL CALC-MCNC: 77 MG/DL (ref 0–100)
LYMPHOCYTES # BLD AUTO: 2.21 10*3/MM3 (ref 0.7–3.1)
LYMPHOCYTES NFR BLD AUTO: 42.7 % (ref 19.6–45.3)
MCH RBC QN AUTO: 30.7 PG (ref 26.6–33)
MCHC RBC AUTO-ENTMCNC: 33.8 G/DL (ref 31.5–35.7)
MCV RBC AUTO: 90.9 FL (ref 79–97)
MONOCYTES # BLD AUTO: 0.87 10*3/MM3 (ref 0.1–0.9)
MONOCYTES NFR BLD AUTO: 16.8 % (ref 5–12)
NEUTROPHILS # BLD AUTO: 1.99 10*3/MM3 (ref 1.7–7)
NEUTROPHILS NFR BLD AUTO: 38.5 % (ref 42.7–76)
NRBC BLD AUTO-RTO: 0 /100 WBC (ref 0–0.2)
PLATELET # BLD AUTO: 231 10*3/MM3 (ref 140–450)
POTASSIUM SERPL-SCNC: 3.9 MMOL/L (ref 3.5–5.2)
PROT SERPL-MCNC: 6.4 G/DL (ref 6–8.5)
PSA SERPL-MCNC: 0.33 NG/ML (ref 0–4)
RBC # BLD AUTO: 4.82 10*6/MM3 (ref 4.14–5.8)
RPR SER QL: NON REACTIVE
SODIUM SERPL-SCNC: 136 MMOL/L (ref 136–145)
TRIGL SERPL-MCNC: 264 MG/DL (ref 0–150)
TSH SERPL DL<=0.005 MIU/L-ACNC: 2.93 UIU/ML (ref 0.27–4.2)
VIT B12 SERPL-MCNC: 438 PG/ML (ref 211–946)
VLDLC SERPL CALC-MCNC: 43 MG/DL (ref 5–40)
WBC # BLD AUTO: 5.17 10*3/MM3 (ref 3.4–10.8)

## 2024-06-20 RX ORDER — CHLORAL HYDRATE 500 MG
1000 CAPSULE ORAL
Start: 2024-06-20

## 2024-06-21 ENCOUNTER — TELEPHONE (OUTPATIENT)
Dept: INTERNAL MEDICINE | Facility: CLINIC | Age: 74
End: 2024-06-21
Payer: MEDICARE

## 2024-06-21 NOTE — TELEPHONE ENCOUNTER
"----- Message from DOUG Goodson sent at 6/20/2024  5:48 PM CDT -----    Per Dr. Goodson, DO:  \"All of his labs look good except for his triglycerides.  Nothing to correlate with the issues of memory loss.  I would continue to take the simvastatin nightly.  He stated that he had been taking it every other day.  I would also add fish oil 1 g daily.  He can get this at any retail pharmacy over-the-counter.  I will add it to his med list.\"      LVM for patient to call office.   "

## 2024-06-27 ENCOUNTER — HOSPITAL ENCOUNTER (OUTPATIENT)
Dept: GENERAL RADIOLOGY | Facility: HOSPITAL | Age: 74
Discharge: HOME | End: 2024-06-27
Payer: MEDICARE

## 2024-06-27 ENCOUNTER — HOSPITAL ENCOUNTER (OUTPATIENT)
Dept: ULTRASOUND IMAGING | Facility: HOSPITAL | Age: 74
Discharge: HOME | End: 2024-06-27
Payer: MEDICARE

## 2024-06-27 ENCOUNTER — OFFICE VISIT (OUTPATIENT)
Dept: INTERNAL MEDICINE | Facility: CLINIC | Age: 74
End: 2024-06-27
Payer: MEDICARE

## 2024-06-27 VITALS
DIASTOLIC BLOOD PRESSURE: 68 MMHG | OXYGEN SATURATION: 97 % | WEIGHT: 195 LBS | HEIGHT: 69 IN | HEART RATE: 83 BPM | BODY MASS INDEX: 28.88 KG/M2 | SYSTOLIC BLOOD PRESSURE: 126 MMHG | TEMPERATURE: 97.6 F

## 2024-06-27 DIAGNOSIS — M79.671 RIGHT FOOT PAIN: ICD-10-CM

## 2024-06-27 DIAGNOSIS — M79.89 RIGHT LEG SWELLING: ICD-10-CM

## 2024-06-27 DIAGNOSIS — L03.115 CELLULITIS OF RIGHT LOWER EXTREMITY: ICD-10-CM

## 2024-06-27 DIAGNOSIS — M79.89 RIGHT LEG SWELLING: Primary | ICD-10-CM

## 2024-06-27 PROCEDURE — 93971 EXTREMITY STUDY: CPT

## 2024-06-27 PROCEDURE — 1126F AMNT PAIN NOTED NONE PRSNT: CPT | Performed by: NURSE PRACTITIONER

## 2024-06-27 PROCEDURE — 73630 X-RAY EXAM OF FOOT: CPT

## 2024-06-27 PROCEDURE — 3074F SYST BP LT 130 MM HG: CPT | Performed by: NURSE PRACTITIONER

## 2024-06-27 PROCEDURE — 99214 OFFICE O/P EST MOD 30 MIN: CPT | Performed by: NURSE PRACTITIONER

## 2024-06-27 PROCEDURE — 3078F DIAST BP <80 MM HG: CPT | Performed by: NURSE PRACTITIONER

## 2024-06-27 RX ORDER — CEPHALEXIN 500 MG/1
500 CAPSULE ORAL 4 TIMES DAILY
Qty: 28 CAPSULE | Refills: 0 | Status: SHIPPED | OUTPATIENT
Start: 2024-06-27 | End: 2024-07-04

## 2024-06-27 NOTE — PROGRESS NOTES
Right foot X-ray shows no fracture or other findings of concern. I am going to treat the patient for a possible infection of the skin and soft tissue in this area and will be sending antibiotics to his pharmacy.  Please encourage him to elevate the right lower extremity.  He can try taking Tylenol or ibuprofen for pain and may try putting ice on this area to see if this helps with his pain as well.

## 2024-06-27 NOTE — PROGRESS NOTES
Subjective     Chief Complaint:  Right foot pain, swelling, redness    HPI:  Patient presents to the office today with a 2-day history of sudden onset swelling, redness and pain in the right foot.  He denies any injuries that he is aware of.  Please see assessment and plan below.    Patient's PMR from outside medical facility reviewed and noted.    Past Medical History:   Past Medical History:   Diagnosis Date    Cancer     skin cancer     Hypertension      Past Surgical History:  Past Surgical History:   Procedure Laterality Date    ARM LESION/CYST EXCISION Right 4/23/2020    Procedure: WIDE LOCAL EXCISION SQUAMOUS CELL CARCINOMA - RIGHT UPPER EXTREMITY;  Surgeon: Kami Palmer MD;  Location: [facility];  Service: General;  Laterality: Right;    COLONOSCOPY       Social History:  reports that he has never smoked. He has never used smokeless tobacco. He reports current alcohol use of about 1.0 standard drink of alcohol per week. He reports that he does not use drugs.    Family History: family history is not on file.      Allergies:  No Known Allergies  Medications:  Prior to Admission medications    Medication Sig Start Date End Date Taking? Authorizing Provider   donepezil (ARICEPT) 10 MG tablet Take 1 tablet by mouth Every Night.   Yes Gary Hammond MD   multivitamin with minerals tablet tablet Take 1 tablet by mouth Daily.   Yes Gary Hammond MD   Nutritional Supplements (Osteo Advance) tablet Take  by mouth.   Yes Gary Hammond MD   Omega-3 Fatty Acids (fish oil) 1000 MG capsule capsule Take 1 capsule by mouth Daily With Breakfast. Obtain OTC. 6/20/24  Yes DOUG Goodson, DO   RAMIPRIL PO Take  by mouth Daily.   Yes Gary Hammond MD   simvastatin (ZOCOR) 40 MG tablet Take 1 tablet by mouth Every Night.   Yes Gary Hammond MD   vitamin D3 125 MCG (5000 UT) capsule capsule Take 1 capsule by mouth Daily.   Yes Gary Hammond MD       Objective  "    Vital Signs: /68 (BP Location: Left arm, Patient Position: Sitting, Cuff Size: Adult)   Pulse 83   Temp 97.6 °F (36.4 °C) (Temporal)   Ht 176 cm (69.29\")   Wt 88.5 kg (195 lb)   SpO2 97%   BMI 28.56 kg/m²   Physical Exam  Vitals and nursing note reviewed.   Constitutional:       General: He is not in acute distress.     Appearance: He is not ill-appearing or toxic-appearing.   HENT:      Head: Normocephalic and atraumatic.      Mouth/Throat:      Mouth: Mucous membranes are moist.      Pharynx: Oropharynx is clear.   Cardiovascular:      Rate and Rhythm: Normal rate and regular rhythm.      Pulses: Normal pulses.           Dorsalis pedis pulses are 2+ on the right side.        Posterior tibial pulses are 2+ on the right side.      Heart sounds: Normal heart sounds.   Pulmonary:      Effort: Pulmonary effort is normal.      Breath sounds: No wheezing, rhonchi or rales.   Abdominal:      General: Bowel sounds are normal. There is no distension.      Palpations: Abdomen is soft.      Tenderness: There is no abdominal tenderness.   Musculoskeletal:         General: No swelling or tenderness. Normal range of motion.      Cervical back: Normal range of motion and neck supple. No tenderness.      Right lower leg: Edema present.      Right ankle: Swelling present.      Right foot: Swelling and tenderness present.        Feet:       Comments: There is asymmetrical edema, RLE greater than left. Swelling worse in right toes 2-4. Patient tender under these toes on both dorsal and plantar surface. Erythema noted throughout the right foot up into the ankle and distal calf   Feet:      Right foot:      Skin integrity: Erythema and warmth (warmth noted up to the distal calf- noticeable temperature difference between RLE and LLE) present. No skin breakdown.      Comments: Tender to palpation.   Skin:     General: Skin is warm and dry.      Findings: No erythema or rash.   Neurological:      General: No focal deficit " present.      Mental Status: He is alert and oriented to person, place, and time.   Psychiatric:         Mood and Affect: Mood normal.         Behavior: Behavior normal.         Thought Content: Thought content normal.         Judgment: Judgment normal.     Results Reviewed:  Office Visit with DOUG Goodson DO (06/19/2024)   Lipid Panel (06/19/2024 13:11)   Comprehensive metabolic panel (06/19/2024 13:11)   XR Foot 3+ View Right (06/27/2024 09:32)  US Venous Doppler Lower Extremity Right (duplex) (06/27/2024 09:28)  Assessment / Plan     Assessment/Plan:  Diagnoses and all orders for this visit:    1. Right leg swelling (Primary)  -     US Venous Doppler Lower Extremity Right (duplex); Future    2. Right foot pain  -     US Venous Doppler Lower Extremity Right (duplex); Future  -     XR Foot 3+ View Right; Future       Patient presents to the office today with a 2-day history of sudden onset swelling, redness and pain to the right foot.  Pain is worst underneath toes 2, 3, 4 on both the dorsal and plantar surface.  He denies any calf or leg pain.  The patient states that he does stay fairly active and does try to walk regularly, does not feel that he has been walking any more so than usual.  He has not been walking outside barefoot, does not feel that he has sustained any insect bites or stepped on anything that he is aware of.  He states he jammed the third toe several months ago after running into something, but otherwise denies any more recent injuries or trauma.  He states the right foot was much more tender to touch yesterday, but this seems to be better today.  He indicates pain is worse with weightbearing.  He has felt warmth to this area but has had no fever that he is aware of.    Patient denies any previous history of gout.  He denies history of DVT and has very little risk factors for DVT.  Again, does deny calf pain.  He has never been a smoker, no recent travel or surgical procedures.  Again,  he does try to stay fairly active and denies any prolonged inactivity.  He is not on any testosterone or other hormone replacement therapy.  He denies any shortness of breath, chest pain or cough.  He has been using an over-the-counter cream called neurop away.  He does describe a stinging sensation, but not necessarily numbness or tingling.  I do not see any overt rash or vesicle formation.  Pulses are palpable.  I am unsure of the exact etiology of the patient's symptoms, however given asymmetrical edema, warmth and erythema of the right lower extremity I did order a venous Doppler ultrasound of the right lower extremity.  This was negative.  I could not be certain that he did not have a very small foreign body on the plantar surface of the second toe.  I was unable to express anything from this area, but this could be causing cellulitis.  X-ray of the right foot was also ordered today as he did have an injury several months ago but did not seek evaluation following that incident.  This did not show any acute findings of bones or soft tissue.  It is plausible that the patient could have gout although the presentation would be atypical so I would lean more toward treating for cellulitis.  No known allergies, will send in a course of Keflex and follow-up in 1 week.  Would encourage patient to try utilizing Tylenol and/or ibuprofen for pain as well.  Encouraged elevation and use of ice.  Patient encouraged to call the office for any questions or concerns prior to that time.    Return in about 1 week (around 7/4/2024). unless patient needs to be seen sooner or acute issues arise.      I have discussed the patient results/orders and and plan/recommendation with them at today's visit.      Allison Powell, ALEX   06/27/2024

## 2024-07-09 ENCOUNTER — OFFICE VISIT (OUTPATIENT)
Dept: INTERNAL MEDICINE | Facility: CLINIC | Age: 74
End: 2024-07-09
Payer: MEDICARE

## 2024-07-09 VITALS
TEMPERATURE: 97.8 F | SYSTOLIC BLOOD PRESSURE: 140 MMHG | WEIGHT: 195.6 LBS | DIASTOLIC BLOOD PRESSURE: 66 MMHG | BODY MASS INDEX: 28.97 KG/M2 | HEIGHT: 69 IN | OXYGEN SATURATION: 98 % | HEART RATE: 76 BPM

## 2024-07-09 DIAGNOSIS — L03.115 CELLULITIS OF RIGHT LOWER EXTREMITY: Primary | ICD-10-CM

## 2024-07-09 PROCEDURE — 1126F AMNT PAIN NOTED NONE PRSNT: CPT | Performed by: NURSE PRACTITIONER

## 2024-07-09 PROCEDURE — 3078F DIAST BP <80 MM HG: CPT | Performed by: NURSE PRACTITIONER

## 2024-07-09 PROCEDURE — 1159F MED LIST DOCD IN RCRD: CPT | Performed by: NURSE PRACTITIONER

## 2024-07-09 PROCEDURE — 99212 OFFICE O/P EST SF 10 MIN: CPT | Performed by: NURSE PRACTITIONER

## 2024-07-09 PROCEDURE — 3077F SYST BP >= 140 MM HG: CPT | Performed by: NURSE PRACTITIONER

## 2024-07-09 PROCEDURE — 1160F RVW MEDS BY RX/DR IN RCRD: CPT | Performed by: NURSE PRACTITIONER

## 2024-07-09 RX ORDER — RAMIPRIL 10 MG/1
10 CAPSULE ORAL DAILY
Qty: 90 CAPSULE | Refills: 3 | Status: SHIPPED | OUTPATIENT
Start: 2024-07-09

## 2024-07-09 NOTE — PROGRESS NOTES
Subjective     Chief Complaint:  Follow-up right foot pain    HPI:  Patient presents to the office today for a follow-up regarding her right leg swelling and right foot pain.  He was seen for this on 6/27 and treated for cellulitis with a course of Keflex.  He reports resolution of symptoms and denies any new concerns today.  Please see assessment and plan below.    Patient's PMR from outside medical facility reviewed and noted.    Past Medical History:   Past Medical History:   Diagnosis Date    Cancer     skin cancer     Hypertension      Past Surgical History:  Past Surgical History:   Procedure Laterality Date    ARM LESION/CYST EXCISION Right 4/23/2020    Procedure: WIDE LOCAL EXCISION SQUAMOUS CELL CARCINOMA - RIGHT UPPER EXTREMITY;  Surgeon: Kami Palmer MD;  Location: [facility];  Service: General;  Laterality: Right;    COLONOSCOPY       Social History:  reports that he has never smoked. He has never used smokeless tobacco. He reports current alcohol use of about 1.0 standard drink of alcohol per week. He reports that he does not use drugs.    Family History: family history is not on file.      Allergies:  No Known Allergies  Medications:  Prior to Admission medications    Medication Sig Start Date End Date Taking? Authorizing Provider   donepezil (ARICEPT) 10 MG tablet Take 1 tablet by mouth Every Night.   Yes Gary Hammond MD   multivitamin with minerals tablet tablet Take 1 tablet by mouth Daily.   Yes Gary Hammond MD   Nutritional Supplements (Osteo Advance) tablet Take  by mouth.   Yes Gary Hammond MD   Omega-3 Fatty Acids (fish oil) 1000 MG capsule capsule Take 1 capsule by mouth Daily With Breakfast. Obtain OTC. 6/20/24  Yes DOUG Goodson DO   ramipril (ALTACE) 10 MG capsule Take 1 capsule by mouth Daily. 7/9/24  Yes Allison Powell APRN   simvastatin (ZOCOR) 40 MG tablet Take 1 tablet by mouth Every Night.   Yes Gary Hammond MD   vitamin  "D3 125 MCG (5000 UT) capsule capsule Take 1 capsule by mouth Daily.   Yes Provider, MD Gary   RAMIPRIL PO Take 10 mg by mouth Daily.  7/9/24 Yes Provider, MD Gary       Objective     Vital Signs: /66 (BP Location: Left arm, Patient Position: Sitting, Cuff Size: Adult)   Pulse 76   Temp 97.8 °F (36.6 °C) (Temporal)   Ht 176 cm (69.29\")   Wt 88.7 kg (195 lb 9.6 oz)   SpO2 98%   BMI 28.64 kg/m²   Physical Exam  Vitals and nursing note reviewed.   Constitutional:       General: He is not in acute distress.     Appearance: He is not ill-appearing or toxic-appearing.   HENT:      Head: Normocephalic and atraumatic.      Mouth/Throat:      Mouth: Mucous membranes are moist.      Pharynx: Oropharynx is clear.   Cardiovascular:      Rate and Rhythm: Normal rate and regular rhythm.      Pulses: Normal pulses.      Heart sounds: Normal heart sounds.   Pulmonary:      Effort: Pulmonary effort is normal.      Breath sounds: No wheezing, rhonchi or rales.   Abdominal:      General: Bowel sounds are normal. There is no distension.      Palpations: Abdomen is soft.      Tenderness: There is no abdominal tenderness.   Musculoskeletal:         General: No swelling or tenderness. Normal range of motion.      Cervical back: Normal range of motion and neck supple. No tenderness.   Skin:     General: Skin is warm and dry.      Findings: No erythema or rash.   Neurological:      General: No focal deficit present.      Mental Status: He is alert and oriented to person, place, and time.   Psychiatric:         Mood and Affect: Mood normal.         Behavior: Behavior normal.         Thought Content: Thought content normal.         Judgment: Judgment normal.       Results Reviewed:  No new results to review at this time.    Assessment / Plan     Assessment/Plan:  Diagnoses and all orders for this visit:    1. Cellulitis of right lower extremity (Primary)    Other orders  -     ramipril (ALTACE) 10 MG capsule; Take 1 " capsule by mouth Daily.  Dispense: 90 capsule; Refill: 3       Patient presents to the office today for a follow-up.  He was last seen on 6/27/2024 with a 2-day history of sudden onset swelling, redness and pain in the right lower extremity.  Pain was described underneath toes 2, 3, and 4 on both the dorsal and plantar surface of the right foot.  He did have asymmetrical edema of the right lower extremity and erythema was noted throughout the right foot up into the ankle and distal calf.  We did perform a venous Doppler ultrasound which was negative for DVT.  X-ray of the right foot was negative as well.  I felt as though there may have been a small foreign body on the plantar surface of the second toe but I was unable to express this.  I did place him on a course of Keflex for cellulitis and patient reports symptoms have completely resolved.  He does not have any residual pain, swelling or erythema noted today.  I do not see any residual foreign body noted today as well.    Patient requesting refill for home medication ramipril, refill sent.  Patient encouraged to call the office for any needs prior to his next scheduled appointment.    Return for Next scheduled follow up. unless patient needs to be seen sooner or acute issues arise.    I have discussed the patient results/orders and and plan/recommendation with them at today's visit.      ALEX Lu   07/09/2024

## 2024-07-24 ENCOUNTER — TELEPHONE (OUTPATIENT)
Dept: GASTROENTEROLOGY | Facility: CLINIC | Age: 74
End: 2024-07-24
Payer: MEDICARE

## 2024-07-24 NOTE — TELEPHONE ENCOUNTER
Caller: TERRY RADER    Relationship to patient: Emergency Contact    Best call back number: [phone]     Patient is needing: PATIENT HAS A FOLLOW UP APPT SCHEDULED FOR 7/31/24 WITH ALEX PINA.  PATIENT'S SPOUSE IS CALLING TO REPORT PATIENT THINKS HE MAY HAVE HEMORRHOIDS AND IS USING PREPARATION H.  SHE DIDN'T KNOW IF THEY WOULD NEED TO EXAM HIM PRIOR TO SCHEDULING HIS COLONOSCOPY.  PLEASE CALL BACK TO ADVISE.

## 2024-07-31 ENCOUNTER — OFFICE VISIT (OUTPATIENT)
Dept: GASTROENTEROLOGY | Facility: CLINIC | Age: 74
End: 2024-07-31
Payer: MEDICARE

## 2024-07-31 VITALS
DIASTOLIC BLOOD PRESSURE: 78 MMHG | HEART RATE: 66 BPM | HEIGHT: 69 IN | OXYGEN SATURATION: 97 % | SYSTOLIC BLOOD PRESSURE: 140 MMHG | WEIGHT: 195.4 LBS | BODY MASS INDEX: 28.94 KG/M2 | TEMPERATURE: 97.1 F

## 2024-07-31 DIAGNOSIS — Z86.010 HX OF COLONIC POLYPS: Primary | ICD-10-CM

## 2024-07-31 DIAGNOSIS — I10 HTN (HYPERTENSION), BENIGN: ICD-10-CM

## 2024-07-31 DIAGNOSIS — Z78.9 NONSMOKER: ICD-10-CM

## 2024-07-31 PROCEDURE — 99214 OFFICE O/P EST MOD 30 MIN: CPT | Performed by: CLINICAL NURSE SPECIALIST

## 2024-07-31 PROCEDURE — 3077F SYST BP >= 140 MM HG: CPT | Performed by: CLINICAL NURSE SPECIALIST

## 2024-07-31 PROCEDURE — 3078F DIAST BP <80 MM HG: CPT | Performed by: CLINICAL NURSE SPECIALIST

## 2024-07-31 PROCEDURE — 1159F MED LIST DOCD IN RCRD: CPT | Performed by: CLINICAL NURSE SPECIALIST

## 2024-07-31 PROCEDURE — 1160F RVW MEDS BY RX/DR IN RCRD: CPT | Performed by: CLINICAL NURSE SPECIALIST

## 2024-07-31 RX ORDER — SODIUM PICOSULFATE, MAGNESIUM OXIDE, AND ANHYDROUS CITRIC ACID 12; 3.5; 1 G/175ML; G/175ML; MG/175ML
175 LIQUID ORAL TAKE AS DIRECTED
Qty: 350 ML | Refills: 0 | Status: SHIPPED | OUTPATIENT
Start: 2024-07-31

## 2024-07-31 NOTE — PROGRESS NOTES
Barry Palmer  1950 7/31/2024  Chief Complaint   Patient presents with    GI Problem     Colon recall-hx of polyps     Subjective   HPI  Barry Palmer is a 74 y.o. male who presents as a referral for preventative maintenance. He has no complaints of nausea or vomiting. No change in bowels. No wt loss. No BRBPR. No melena. There is no family hx for colon cancer. No abdominal pain.  He says he does have hemorrhoids and they have flared recently.   Past Medical History:   Diagnosis Date    Cancer     skin cancer     Hypertension      Past Surgical History:   Procedure Laterality Date    ARM LESION/CYST EXCISION Right 04/23/2020    Procedure: WIDE LOCAL EXCISION SQUAMOUS CELL CARCINOMA - RIGHT UPPER EXTREMITY;  Surgeon: Kami Palmer MD;  Location: [facility] OR;  Service: General;  Laterality: Right;    COLONOSCOPY  09/04/2014    Dr. Wise-hyperplastic polyp,diverticula left colon     Outpatient Medications Marked as Taking for the 7/31/24 encounter (Office Visit) with Kerri Sierra APRN   Medication Sig Dispense Refill    donepezil (ARICEPT) 10 MG tablet Take 1 tablet by mouth Every Night.      multivitamin with minerals tablet tablet Take 1 tablet by mouth Daily.      Nutritional Supplements (Osteo Advance) tablet Take  by mouth.      Omega-3 Fatty Acids (fish oil) 1000 MG capsule capsule Take 1 capsule by mouth Daily With Breakfast. Obtain OTC.      ramipril (ALTACE) 10 MG capsule Take 1 capsule by mouth Daily. 90 capsule 3    simvastatin (ZOCOR) 40 MG tablet Take 1 tablet by mouth Every Night.      vitamin D3 125 MCG (5000 UT) capsule capsule Take 1 capsule by mouth Daily.       No Known Allergies  Social History     Socioeconomic History    Marital status:    Tobacco Use    Smoking status: Never    Smokeless tobacco: Never   Vaping Use    Vaping status: Never Used   Substance and Sexual Activity    Alcohol use: Yes     Alcohol/week: 28.0 standard drinks of alcohol     Types: 28 Cans of  "beer per week     Comment: beer    Drug use: Never    Sexual activity: Defer     Family History   Problem Relation Age of Onset    Colon cancer Neg Hx     Colon polyps Neg Hx      Health Maintenance   Topic Date Due    TDAP/TD VACCINES (1 - Tdap) Never done    ZOSTER VACCINE (1 of 2) Never done    RSV Vaccine - Adults (1 - 1-dose 60+ series) Never done    ANNUAL WELLNESS VISIT  Never done    COVID-19 Vaccine (4 - 2023-24 season) 09/01/2023    INFLUENZA VACCINE  08/01/2024    LIPID PANEL  06/19/2025    BMI FOLLOWUP  06/19/2025    HEPATITIS C SCREENING  Completed    Pneumococcal Vaccine 65+  Completed       REVIEW OF SYSTEMS  General: well appearing, no fever chills or sweats, no unexplained wt loss  HEENT: no acute visual or hearing disturbances  Cardiovascular: No chest pain or palpitations  Pulmonary: No shortness of breath, coughing, wheezing or hemoptysis  : No burning, urgency, hematuria, or dysuria  Musculoskeletal: No joint pain or stiffness  Peripheral: no edema  Skin: No lesions or rashes  Neuro: No dizziness, headaches, stroke, syncope  Endocrine: No hot or cold intolerances  Hematological: No blood dyscrasias    Objective   Vitals:    07/31/24 0949   BP: 140/78   BP Location: Left arm   Pulse: 66   Temp: 97.1 °F (36.2 °C)   TempSrc: Temporal   SpO2: 97%   Weight: 88.6 kg (195 lb 6.4 oz)   Height: 176 cm (69.29\")     Body mass index is 28.61 kg/m².         PHYSICAL EXAM  General: age appropriate well nourished well appearing, no acute distress  Head: normocephalic and atraumatic  Global assessment-supple  Neck-No JVD noted, no lymphadenopathy  Pulmonary-clear to auscultation bilaterally, normal respiratory effort  Cardiovascular-normal rate and rhythm, normal heart sounds, S1 and S2 noted  Abdomen-soft, non tender, non distended, normal bowel sounds all 4 quadrants, no hepatosplenomegaly noted  Extremities-No clubbing cyanosis or edema  Neuro-Non focal, converses appropriately, awake, alert, " oriented    Assessment & Plan     Diagnoses and all orders for this visit:    1. Hx of colonic polyps (Primary)  -     Case Request; Standing  -     Case Request  -     Sod Picosulfate-Mag Ox-Cit Acd (Clenpiq) 10-3.5-12 MG-GM -GM/175ML solution; Take 175 mL by mouth Take As Directed.  Dispense: 350 mL; Refill: 0    2. Nonsmoker    3. HTN (hypertension), benign  Comments:  cont BP medication the day procedure    Other orders  -     Implement Anesthesia Orders Day of Procedure; Standing  -     Follow Anesthesia Guidelines / Protocol; Future  -     Obtain Informed Consent; Future  -     Verify bowel prep was successful; Standing  -     Obtain Informed Consent; Standing    Soak in warm bath  Keep stool soft  Recticare for hemorrhoids    COLONOSCOPY WITH ANESTHESIA (N/A)  Body mass index is 28.61 kg/m².    Patient instructions on prep prior to procedure provided to the patient.    All risks, benefits, alternatives, and indications of colonoscopy procedure have been discussed with the patient. Risks to include perforation of the colon requiring possible surgery or colostomy, risk of bleeding from biopsies or removal of colon tissue, possibility of missing a colon polyp or cancer, or adverse drug reaction.  Benefits to include the diagnosis and management of disease of the colon and rectum. Alternatives to include barium enema, radiographic evaluation, lab testing or no intervention. Pt verbalizes understanding and agrees.     Kerri Sierra, ALEX  2024  11:02 CDT      IF YOU SMOKE OR USE TOBACCO PLEASE READ THE FOLLOWIN minutes reading provided    Why is smoking bad for me?  Smoking increases the risk of heart disease, lung disease, vascular disease, stroke, and cancer.     If you smoke, STOP!    If you would like more information on quitting smoking, please visit the [employer] website: [email or web address]   This link will provide additional resources  including the QUIT line and the Beat the Pack support groups.     For more information:    Quit Now [place]  1-800-QUIT-NOW  [email or web address]-[email or web address]

## 2024-08-01 PROBLEM — Z86.0100 HX OF COLONIC POLYPS: Status: ACTIVE | Noted: 2024-07-31

## 2024-08-01 PROBLEM — Z86.010 HX OF COLONIC POLYPS: Status: ACTIVE | Noted: 2024-07-31

## 2024-08-05 ENCOUNTER — OFFICE VISIT (OUTPATIENT)
Dept: INTERNAL MEDICINE | Facility: CLINIC | Age: 74
End: 2024-08-05
Payer: MEDICARE

## 2024-08-05 VITALS
HEIGHT: 69 IN | TEMPERATURE: 97 F | OXYGEN SATURATION: 98 % | DIASTOLIC BLOOD PRESSURE: 66 MMHG | SYSTOLIC BLOOD PRESSURE: 138 MMHG | HEART RATE: 79 BPM | WEIGHT: 195 LBS | BODY MASS INDEX: 28.88 KG/M2

## 2024-08-05 DIAGNOSIS — L72.0 EPIDERMAL CYST: ICD-10-CM

## 2024-08-05 DIAGNOSIS — S41.102A: Primary | ICD-10-CM

## 2024-08-05 DIAGNOSIS — R41.3 MEMORY IMPAIRMENT: ICD-10-CM

## 2024-08-05 PROCEDURE — 3078F DIAST BP <80 MM HG: CPT | Performed by: INTERNAL MEDICINE

## 2024-08-05 PROCEDURE — 3075F SYST BP GE 130 - 139MM HG: CPT | Performed by: INTERNAL MEDICINE

## 2024-08-05 PROCEDURE — 1159F MED LIST DOCD IN RCRD: CPT | Performed by: INTERNAL MEDICINE

## 2024-08-05 PROCEDURE — 1160F RVW MEDS BY RX/DR IN RCRD: CPT | Performed by: INTERNAL MEDICINE

## 2024-08-05 PROCEDURE — 1126F AMNT PAIN NOTED NONE PRSNT: CPT | Performed by: INTERNAL MEDICINE

## 2024-08-05 PROCEDURE — 99213 OFFICE O/P EST LOW 20 MIN: CPT | Performed by: INTERNAL MEDICINE

## 2024-08-05 RX ORDER — MUPIROCIN CALCIUM 20 MG/G
1 CREAM TOPICAL 2 TIMES DAILY
Qty: 15 G | Refills: 1 | Status: SHIPPED | OUTPATIENT
Start: 2024-08-05

## 2024-08-05 RX ORDER — DOXYCYCLINE HYCLATE 100 MG/1
100 CAPSULE ORAL 2 TIMES DAILY
Qty: 10 CAPSULE | Refills: 0 | Status: SHIPPED | OUTPATIENT
Start: 2024-08-05

## 2024-08-05 NOTE — PROGRESS NOTES
"    Chief Complaint  Arm Injury (Friday evening patient [place] dock he slipped and tried catching himself.  He scrapped his left arm across the dock.  Saturday he went to [facility] for a tetanus shot. Worried about it getting infection.  Using Neosporin. )    Subjective        Barry Palmer presents to [facility]  Arm Injury       See below.     Objective   Vital Signs:  /66 (BP Location: Left arm, Patient Position: Sitting, Cuff Size: Adult)   Pulse 79   Temp 97 °F (36.1 °C) (Temporal)   Ht 176 cm (69.29\")   Wt 88.5 kg (195 lb)   SpO2 98%   BMI 28.56 kg/m²   Estimated body mass index is 28.56 kg/m² as calculated from the following:    Height as of this encounter: 176 cm (69.29\").    Weight as of this encounter: 88.5 kg (195 lb).         Physical Exam  Constitutional:       Comments: Seen and discussed with his wife.   HENT:      Head: Normocephalic and atraumatic.   Eyes:      Conjunctiva/sclera: Conjunctivae normal.      Pupils: Pupils are equal, round, and reactive to light.   Pulmonary:      Effort: Pulmonary effort is normal. No respiratory distress.   Musculoskeletal:         General: No swelling.   Skin:     General: Skin is warm and dry.      Findings: No rash.      Comments: See below photo of the left arm.   Neurological:      General: No focal deficit present.      Mental Status: He is alert and oriented to person, place, and time.   Psychiatric:         Mood and Affect: Mood normal.         Behavior: Behavior normal.         Thought Content: Thought content normal.         Judgment: Judgment normal.       Result Review :  Nothing pertinent to review.         Assessment and Plan   Diagnoses and all orders for this visit:    1. Avulsion of left upper extremity, initial encounter (Primary)  -     doxycycline (VIBRAMYCIN) 100 MG capsule; Take 1 capsule by mouth 2 (Two) Times a Day.  Dispense: 10 capsule; Refill: 0  -     mupirocin (BACTROBAN) 2 % " cream; Apply 1 Application topically to the appropriate area as directed 2 (Two) Times a Day.  Dispense: 15 g; Refill: 1    2. Epidermal cyst    3. Memory impairment       Presents today for problem visit.    [place]  He sustained an abrasion and avulsion injury to the left forearm.  Picture provided above.  I debrided a small area of dead skin off one of the abrasions.  I feel overall that nothing looks overtly infected, but his wife was worried about some yellow drainage from the middle abrasion.  He has been keeping it clean, dressed, and has been using triple antibiotic ointment.  I will prescribe some Bactroban and have him take a short course of oral doxycycline.    He also pointed out an inflamed epidermal cyst of the right arm.  I squeezed out the contents and placed a Band-Aid.    He again brought up whether or not he should go off of the Aricept previously prescribed by Dr. Little.  We discussed this at his last visit as well.  He had previously undergone an MRI in May 2019 with no abnormalities.  I obtained some different vitamins and thyroid testing at his visit in June.  We discussed going to see neurology.  We discussed this again today.  He is not willing to do so at the moment.  I explained to him and his wife that if he does go off of the Aricept that she should keep a very close eye on whether or not he has any worsening issues off of it.  The medication could be doing more for him than what they believe.  I still think that he should get formally evaluated given some other concerns, but he declines to do so.  Again, I did not start the Aricept.  It was started by Dr. Little.    He will keep regularly scheduled follow-up in December.  If he has further problems, then he can reach out or return sooner.       Follow Up   Return for Next scheduled follow up.  Patient was given instructions and counseling regarding his condition or for health maintenance  advice. Please see specific information pulled into the AVS if appropriate.      SARAH Goodson DO       Electronically signed by DOUG Goodson DO, 08/05/24, 9:50 AM CDT.

## 2024-08-06 ENCOUNTER — TELEPHONE (OUTPATIENT)
Dept: INTERNAL MEDICINE | Facility: CLINIC | Age: 74
End: 2024-08-06
Payer: MEDICARE

## 2024-08-06 NOTE — TELEPHONE ENCOUNTER
Called the patient's spouse yesterday with her results and the spouse mentioned that the prescription for mupirocin was sent to the patient's pharmacy and his insurance is not going to pay for it. Told the spouse, per verbal, that this would be checked into this morning.    Dr DOUG Goodson states to advise the patient to use neosporin on the left arm abrasion.    Returned a call to the patient's spouse and advised her to use the neosporin on the patient's left arm abrasion. Voiced understanding was given by Mrs. Palmer and she was very thankful for the returned call.

## 2024-08-14 ENCOUNTER — TELEPHONE (OUTPATIENT)
Dept: INTERNAL MEDICINE | Facility: CLINIC | Age: 74
End: 2024-08-14

## 2024-08-14 ENCOUNTER — HOSPITAL ENCOUNTER (OUTPATIENT)
Dept: GENERAL RADIOLOGY | Facility: HOSPITAL | Age: 74
Discharge: HOME | End: 2024-08-14
Admitting: INTERNAL MEDICINE
Payer: MEDICARE

## 2024-08-14 ENCOUNTER — OFFICE VISIT (OUTPATIENT)
Dept: INTERNAL MEDICINE | Facility: CLINIC | Age: 74
End: 2024-08-14
Payer: MEDICARE

## 2024-08-14 VITALS
TEMPERATURE: 98.6 F | HEIGHT: 69 IN | BODY MASS INDEX: 29.03 KG/M2 | SYSTOLIC BLOOD PRESSURE: 128 MMHG | DIASTOLIC BLOOD PRESSURE: 68 MMHG | HEART RATE: 78 BPM | WEIGHT: 196 LBS | OXYGEN SATURATION: 97 %

## 2024-08-14 DIAGNOSIS — M79.89 SWELLING OF TOE OF RIGHT FOOT: Primary | ICD-10-CM

## 2024-08-14 DIAGNOSIS — M79.89 SWELLING OF TOE OF RIGHT FOOT: ICD-10-CM

## 2024-08-14 PROCEDURE — 1125F AMNT PAIN NOTED PAIN PRSNT: CPT | Performed by: INTERNAL MEDICINE

## 2024-08-14 PROCEDURE — 99213 OFFICE O/P EST LOW 20 MIN: CPT | Performed by: INTERNAL MEDICINE

## 2024-08-14 PROCEDURE — 3078F DIAST BP <80 MM HG: CPT | Performed by: INTERNAL MEDICINE

## 2024-08-14 PROCEDURE — 73630 X-RAY EXAM OF FOOT: CPT

## 2024-08-14 PROCEDURE — 1159F MED LIST DOCD IN RCRD: CPT | Performed by: INTERNAL MEDICINE

## 2024-08-14 PROCEDURE — 1160F RVW MEDS BY RX/DR IN RCRD: CPT | Performed by: INTERNAL MEDICINE

## 2024-08-14 PROCEDURE — 3074F SYST BP LT 130 MM HG: CPT | Performed by: INTERNAL MEDICINE

## 2024-08-14 RX ORDER — AMOXICILLIN AND CLAVULANATE POTASSIUM 875; 125 MG/1; MG/1
1 TABLET, FILM COATED ORAL 2 TIMES DAILY
Qty: 14 TABLET | Refills: 0 | Status: SHIPPED | OUTPATIENT
Start: 2024-08-14

## 2024-08-14 RX ORDER — DOXYCYCLINE HYCLATE 100 MG/1
100 CAPSULE ORAL 2 TIMES DAILY
Qty: 14 CAPSULE | Refills: 0 | Status: SHIPPED | OUTPATIENT
Start: 2024-08-14

## 2024-08-14 NOTE — TELEPHONE ENCOUNTER
Caller:     DIORTERRY        Relationship: SPOUSE     Best call back number: [phone]    What is the best time to reach you: ANYTIME     Who are you requesting to speak with (clinical staff, provider,  specific staff member): CLINICAL     Do you know the name of the person who called: CLINICAL     What was the call regarding: SHE IS ASKING CLINICAL TO CALL HER BACK AFTER HIS APPOINTMENT TO LET HER KNOW ABOUT HIS DRS VISIT SHE STATES HE WILL NOT REMEMBER     Is it okay if the provider responds through MyChart: CALL BACK REQUEST

## 2024-08-14 NOTE — PROGRESS NOTES
"    Chief Complaint  Foot Swelling (Right foot swollen, red and sore to touch x 1 day.  Not aware of an injury.  Went walking with closed toe shoes yesterday on gravel. Sole of foot is also painful.  )    Subjective        Barry Palmer presents to [facility]  History of Present Illness  See below.     Objective   Vital Signs:  /68 (BP Location: Left arm, Patient Position: Sitting, Cuff Size: Adult)   Pulse 78   Temp 98.6 °F (37 °C) (Temporal)   Ht 176 cm (69.29\")   Wt 88.9 kg (196 lb)   SpO2 97%   BMI 28.70 kg/m²   Estimated body mass index is 28.7 kg/m² as calculated from the following:    Height as of this encounter: 176 cm (69.29\").    Weight as of this encounter: 88.9 kg (196 lb).         Physical Exam  HENT:      Head: Normocephalic and atraumatic.   Eyes:      Conjunctiva/sclera: Conjunctivae normal.      Pupils: Pupils are equal, round, and reactive to light.   Cardiovascular:      Rate and Rhythm: Normal rate and regular rhythm.      Heart sounds: Normal heart sounds.   Pulmonary:      Effort: Pulmonary effort is normal. No respiratory distress.      Breath sounds: Normal breath sounds.   Musculoskeletal:         General: Swelling and tenderness present. No deformity or signs of injury.      Cervical back: Neck supple.   Skin:     General: Skin is warm and dry.      Findings: Erythema present. No rash.      Comments: See below photos.    Neurological:      General: No focal deficit present.      Mental Status: He is alert and oriented to person, place, and time.   Psychiatric:         Mood and Affect: Mood normal.         Behavior: Behavior normal.         Thought Content: Thought content normal.         Judgment: Judgment normal.              Result Review :  Nothing pertinent to review.         Assessment and Plan   Diagnoses and all orders for this visit:    1. Swelling of toe of right foot (Primary)  -     XR Foot 3+ View Right; Future  -     " "amoxicillin-clavulanate (AUGMENTIN) 875-125 MG per tablet; Take 1 tablet by mouth 2 (Two) Times a Day.  Dispense: 14 tablet; Refill: 0  -     doxycycline (VIBRAMYCIN) 100 MG capsule; Take 1 capsule by mouth 2 (Two) Times a Day.  Dispense: 14 capsule; Refill: 0       Presents today for problem visit.    He has concerns over some swelling, discomfort, and redness of the right third toe.  [place]  He states that he on close toed shoes the last few days.  They were perhaps a bit tight.  He does not remember being stung by anything.  He does not remember any injury.  He does have on sandals today just for the ease of use.    No history of gout.  This does not appear to be a gout flare on exam.  He has focal swelling of the right third toe that appears to have some friction changes to the lateral surface of that digit depicted in the photo above.  This is not draining.  This is not fluctuant.  He does have some mild redness extending to the dorsum of the right foot.  He does not have any significant tenderness.  He states that the area is a bit \"touchy\" when I was palpating it.    Sending him for plain films of the right foot at [facility]\"".  I will also treat him with a course of Augmentin/doxycycline.    He knows to reach out if this area worsens or he starts to have systemic symptoms.  We will update him on his plain films when results are available.    Advised him that he may want to consider not wearing those particular shoes again.  They were likely too tight and caused pressure between the toes leading to the current problem.      Follow Up   Return for Next scheduled follow up.  Patient was given instructions and counseling regarding his condition or for health maintenance advice. Please see specific information pulled into the AVS if appropriate.      SARAH Goodson DO       Electronically signed by DOUG Goodson DO, 08/14/24, 1:43 PM CDT.  "

## 2024-08-26 NOTE — TELEPHONE ENCOUNTER
Tried to call wife as she is on verbal- but this message is almost 2 weeks old before we received. If this was a one time thing to call after visit it is fine but we are not able to call after every visit to go over what was discussed verbally- we can send print out of office notes home with patient. Or they can be reviewed on my chart

## 2024-09-03 ENCOUNTER — HOSPITAL ENCOUNTER (OUTPATIENT)
Facility: HOSPITAL | Age: 74
Setting detail: HOSPITAL OUTPATIENT SURGERY
Discharge: HOME | End: 2024-09-03
Attending: INTERNAL MEDICINE | Admitting: INTERNAL MEDICINE
Payer: MEDICARE

## 2024-09-03 ENCOUNTER — ANESTHESIA (OUTPATIENT)
Dept: GASTROENTEROLOGY | Facility: HOSPITAL | Age: 74
End: 2024-09-03
Payer: MEDICARE

## 2024-09-03 ENCOUNTER — ANESTHESIA EVENT (OUTPATIENT)
Dept: GASTROENTEROLOGY | Facility: HOSPITAL | Age: 74
End: 2024-09-03
Payer: MEDICARE

## 2024-09-03 VITALS
SYSTOLIC BLOOD PRESSURE: 143 MMHG | RESPIRATION RATE: 18 BRPM | HEIGHT: 69 IN | HEART RATE: 58 BPM | DIASTOLIC BLOOD PRESSURE: 68 MMHG | BODY MASS INDEX: 28.14 KG/M2 | WEIGHT: 190 LBS | TEMPERATURE: 98.4 F | OXYGEN SATURATION: 98 %

## 2024-09-03 DIAGNOSIS — Z86.010 HX OF COLONIC POLYPS: ICD-10-CM

## 2024-09-03 PROCEDURE — 45385 COLONOSCOPY W/LESION REMOVAL: CPT | Performed by: INTERNAL MEDICINE

## 2024-09-03 PROCEDURE — 88305 TISSUE EXAM BY PATHOLOGIST: CPT | Performed by: INTERNAL MEDICINE

## 2024-09-03 PROCEDURE — 25810000003 SODIUM CHLORIDE 0.9 % SOLUTION: Performed by: ANESTHESIOLOGY

## 2024-09-03 PROCEDURE — 25010000002 PROPOFOL 10 MG/ML EMULSION

## 2024-09-03 RX ORDER — PROPOFOL 10 MG/ML
VIAL (ML) INTRAVENOUS AS NEEDED
Status: DISCONTINUED | OUTPATIENT
Start: 2024-09-03 | End: 2024-09-03 | Stop reason: SURG

## 2024-09-03 RX ORDER — SODIUM CHLORIDE 0.9 % (FLUSH) 0.9 %
10 SYRINGE (ML) INJECTION AS NEEDED
Status: CANCELLED | OUTPATIENT
Start: 2024-09-03

## 2024-09-03 RX ORDER — SODIUM CHLORIDE 0.9 % (FLUSH) 0.9 %
10 SYRINGE (ML) INJECTION AS NEEDED
Status: DISCONTINUED | OUTPATIENT
Start: 2024-09-03 | End: 2024-09-03 | Stop reason: HOSPADM

## 2024-09-03 RX ORDER — LIDOCAINE HYDROCHLORIDE 10 MG/ML
0.5 INJECTION, SOLUTION EPIDURAL; INFILTRATION; INTRACAUDAL; PERINEURAL ONCE AS NEEDED
Status: DISCONTINUED | OUTPATIENT
Start: 2024-09-03 | End: 2024-09-03 | Stop reason: HOSPADM

## 2024-09-03 RX ORDER — SODIUM CHLORIDE 9 MG/ML
100 INJECTION, SOLUTION INTRAVENOUS CONTINUOUS
Status: CANCELLED | OUTPATIENT
Start: 2024-09-03

## 2024-09-03 RX ORDER — SODIUM CHLORIDE 9 MG/ML
500 INJECTION, SOLUTION INTRAVENOUS CONTINUOUS PRN
Status: DISCONTINUED | OUTPATIENT
Start: 2024-09-03 | End: 2024-09-03 | Stop reason: HOSPADM

## 2024-09-03 RX ORDER — SODIUM CHLORIDE 9 MG/ML
1000 INJECTION, SOLUTION INTRAVENOUS CONTINUOUS
Status: DISCONTINUED | OUTPATIENT
Start: 2024-09-03 | End: 2024-09-03 | Stop reason: HOSPADM

## 2024-09-03 RX ORDER — SODIUM CHLORIDE 0.9 % (FLUSH) 0.9 %
10 SYRINGE (ML) INJECTION EVERY 12 HOURS SCHEDULED
Status: CANCELLED | OUTPATIENT
Start: 2024-09-03

## 2024-09-03 RX ORDER — LIDOCAINE HYDROCHLORIDE 20 MG/ML
INJECTION, SOLUTION EPIDURAL; INFILTRATION; INTRACAUDAL; PERINEURAL AS NEEDED
Status: DISCONTINUED | OUTPATIENT
Start: 2024-09-03 | End: 2024-09-03 | Stop reason: SURG

## 2024-09-03 RX ORDER — SODIUM CHLORIDE 9 MG/ML
40 INJECTION, SOLUTION INTRAVENOUS AS NEEDED
Status: CANCELLED | OUTPATIENT
Start: 2024-09-03

## 2024-09-03 RX ADMIN — PROPOFOL 250 MG: 10 INJECTION, EMULSION INTRAVENOUS at 11:04

## 2024-09-03 RX ADMIN — LIDOCAINE HYDROCHLORIDE 100 MG: 20 INJECTION, SOLUTION EPIDURAL; INFILTRATION; INTRACAUDAL; PERINEURAL at 11:04

## 2024-09-03 RX ADMIN — SODIUM CHLORIDE 500 ML: 9 INJECTION, SOLUTION INTRAVENOUS at 09:12

## 2024-09-03 NOTE — H&P
[facility]  Pre Procedure History & Physical    Chief Complaint:   History of polyps    Subjective     HPI:   Here for colonoscopy.  History of polyps    Past Medical History:   Past Medical History:   Diagnosis Date    Cancer     skin cancer     Hypertension        Past Surgical History:  Past Surgical History:   Procedure Laterality Date    ARM LESION/CYST EXCISION Right 04/23/2020    Procedure: WIDE LOCAL EXCISION SQUAMOUS CELL CARCINOMA - RIGHT UPPER EXTREMITY;  Surgeon: Kami Palmer MD;  Location: [facility];  Service: General;  Laterality: Right;    COLONOSCOPY  09/04/2014    Dr. Wise-hyperplastic polyp,diverticula left colon       Family History:  Family History   Problem Relation Age of Onset    Colon cancer Neg Hx     Colon polyps Neg Hx        Social History:   reports that he has never smoked. He has never used smokeless tobacco. He reports current alcohol use of about 28.0 standard drinks of alcohol per week. He reports that he does not use drugs.    Medications:   Prior to Admission medications    Medication Sig Start Date End Date Taking? Authorizing Provider   donepezil (ARICEPT) 10 MG tablet Take 1 tablet by mouth Every Night.   Yes Gary Hammond MD   multivitamin with minerals tablet tablet Take 1 tablet by mouth Daily.   Yes Gary Hammond MD   Nutritional Supplements (Osteo Advance) tablet Take  by mouth.   Yes Gary Hammond MD   Omega-3 Fatty Acids (fish oil) 1000 MG capsule capsule Take 1 capsule by mouth Daily With Breakfast. Obtain OTC. 6/20/24  Yes DOUG Goodson DO   simvastatin (ZOCOR) 40 MG tablet Take 1 tablet by mouth Every Night.   Yes Gary Hammond MD   vitamin D3 125 MCG (5000 UT) capsule capsule Take 1 capsule by mouth Daily.   Yes Gary Hammond MD   mupirocin (BACTROBAN) 2 % cream Apply 1 Application topically to the appropriate area as directed 2 (Two) Times a Day. 8/5/24   DOUG Goodson DO  "  amoxicillin-clavulanate (AUGMENTIN) 875-125 MG per tablet Take 1 tablet by mouth 2 (Two) Times a Day. 8/14/24 9/3/24  DOUG Goodson, DO   doxycycline (VIBRAMYCIN) 100 MG capsule Take 1 capsule by mouth 2 (Two) Times a Day. 8/14/24 9/3/24  DOUG Goodson,    ramipril (ALTACE) 10 MG capsule Take 1 capsule by mouth Daily. 7/9/24 9/3/24  Allison Powell APRN   Sod Picosulfate-Mag Ox-Cit Acd (Clenpiq) 10-3.5-12 MG-GM -GM/175ML solution Take 175 mL by mouth Take As Directed. 7/31/24 9/3/24  Kerri Sierra APRN       Allergies:  Patient has no known allergies.    Objective     Blood pressure 166/61, pulse 63, temperature 98.4 °F (36.9 °C), resp. rate 18, height 175.3 cm (69\"), weight 86.2 kg (190 lb), SpO2 98%.    Physical Exam   Constitutional: Pt is oriented to person, place, and in no distress.   HENT: Mouth/Throat: Oropharynx is clear.   Cardiovascular: Normal rate, regular rhythm.    Pulmonary/Chest: Effort normal. No respiratory distress. No  wheezes.   Abdominal: Soft. Non-distended.  Skin: Skin is warm and dry.   Psychiatric: Mood, memory, affect and judgment appear normal.     Assessment & Plan     Diagnosis:  History of polyps    Anticipated Surgical Procedure:    Proceed with colonoscopy as scheduled    The following major R/B/A were discussed with the patient, however the list is not all inclusive . Risk:  Bleeding (immediate and delayed), perforation (rupture or tear), reaction to medication, missed lesion/cancer, pain during the procedure, infection, need for surgery, need for ostomy, need for mechanical ventilation (breathing machine), death.  Benefits: removal of polyp/tissue, burn/clip/or inject to stop bleeding, removal of foreign body, dilate any stricture.  Alternatives: Xray or CT, surgery, do nothing with associated risk   The patient was given time to ask question and received explanation, and agrees to proceed as per History and Physical.   No guarantee given or " expressed.    EMR Dragon/transcription disclaimer: Much of this encounter note is an electronic transcription/translation of spoken language to printed text.  The electronic translation of spoken language may permit erroneous, or at times, nonsensical words or phrases to be inadvertently transcribed.  Although I have reviewed the note for such errors, some may still exist.    Rolando Joe MD  10:55 CDT  9/3/2024

## 2024-09-03 NOTE — ANESTHESIA POSTPROCEDURE EVALUATION
"Patient: Barry Palmer    Procedure Summary       Date: 09/03/24 Room / Location: [facility] ENDOSCOPY 5 / [facility]    Anesthesia Start: 1059 Anesthesia Stop: 1126    Procedure: COLONOSCOPY WITH ANESTHESIA Diagnosis:       Hx of colonic polyps      (Hx of colonic polyps [Z86.010])    Surgeons: Rolando Joe MD Provider: Dillan Nina CRNA    Anesthesia Type: MAC ASA Status: 2            Anesthesia Type: MAC    Vitals  Vitals Value Taken Time   /68 09/03/24 1141   Temp     Pulse 55 09/03/24 1142   Resp 18 09/03/24 1140   SpO2 98 % 09/03/24 1142   Vitals shown include unfiled device data.        Post Anesthesia Care and Evaluation    Patient location during evaluation: PHASE II  Patient participation: complete - patient participated  Level of consciousness: awake  Pain management: adequate    Airway patency: patent  Anesthetic complications: No anesthetic complications    Cardiovascular status: acceptable  Respiratory status: acceptable  Hydration status: acceptable    Comments: /68   Pulse 58   Temp 98.4 °F (36.9 °C)   Resp 18   Ht 175.3 cm (69\")   Wt 86.2 kg (190 lb)   SpO2 98%   BMI 28.06 kg/m²       "

## 2024-09-03 NOTE — ANESTHESIA PREPROCEDURE EVALUATION
Anesthesia Evaluation     Patient summary reviewed   no history of anesthetic complications:   NPO Solid Status: > 8 hours  NPO Liquid Status: > 8 hours           Airway   Mallampati: II  TM distance: >3 FB  Neck ROM: full  Dental - normal exam         Pulmonary - normal exam    breath sounds clear to auscultation  (-) asthma, recent URI, sleep apnea, not a smoker  Cardiovascular - normal exam  Exercise tolerance: good (4-7 METS)    ECG reviewed  Rhythm: regular  Rate: normal    (+) hypertension  (-) pacemaker, past MI, angina, cardiac stents      Neuro/Psych  (-) seizures, TIA, CVA  GI/Hepatic/Renal/Endo    (-) GERD, liver disease, no renal disease, diabetes, no thyroid disorder    Musculoskeletal     Abdominal    Substance History      OB/GYN          Other      history of cancer                      Anesthesia Plan    ASA 2     MAC     intravenous induction     Anesthetic plan, risks, benefits, and alternatives have been provided, discussed and informed consent has been obtained with: patient.

## 2024-09-04 ENCOUNTER — TELEPHONE (OUTPATIENT)
Dept: INTERNAL MEDICINE | Facility: CLINIC | Age: 74
End: 2024-09-04
Payer: MEDICARE

## 2024-09-04 NOTE — TELEPHONE ENCOUNTER
Caller: TERRY RADER    Relationship: Emergency Contact    Best call back number: [phone]     What is the best time to reach you: ANYTIME    Who are you requesting to speak with (clinical staff, provider,  specific staff member): PROVIDER    What was the call regarding: PATIENT'S WIFE IS CALLING IN TO KNOW IF PATIENT SHOULD BE TAKING HIS BLOOD PRESSURE MEDICATION. SHE STATED HE INFORMED THAT HIS PREVIOUS PCP DR. SALGADO SAID HE DIDN'T HAVE TO TAKE IT ANYMORE SINCE HE LOST 40 LBS    PATIENT'S WIFE STATED SHE BELIEVES HE'S BEEN OFF OF THE MEDICATION FOR OVER A YEAR, BUT IT HAS STILL BEEN GETTING REFILLED. PATIENT IS NOT TAKING IT. SHE IS WANTING KNOW IF DR. BARAKAT  IS HE AWARE THAT HE DOES NOT TAKE BLOOD PRESSURE AT ALL?  OR IF HE THINK THE PATIENT SHOULD BE ON IT?    PATIENT DOES TAKE MEDICATION FOR HIS CHOLESTEROL AS WELL AS VITAMINS DAILY.    PLEASE CALL TO DISCUSS AND ADVISE.

## 2024-09-05 LAB
CYTO UR: NORMAL
LAB AP CASE REPORT: NORMAL
Lab: NORMAL
PATH REPORT.FINAL DX SPEC: NORMAL
PATH REPORT.GROSS SPEC: NORMAL

## 2024-09-09 ENCOUNTER — TELEPHONE (OUTPATIENT)
Dept: INTERNAL MEDICINE | Facility: CLINIC | Age: 74
End: 2024-09-09
Payer: MEDICARE

## 2024-09-09 DIAGNOSIS — S41.102A: ICD-10-CM

## 2024-09-09 RX ORDER — MUPIROCIN 20 MG/G
1 OINTMENT TOPICAL 2 TIMES DAILY
Qty: 30 G | Refills: 1 | Status: SHIPPED | OUTPATIENT
Start: 2024-09-09

## 2024-09-09 RX ORDER — MUPIROCIN CALCIUM 20 MG/G
1 CREAM TOPICAL 2 TIMES DAILY
Qty: 15 G | Refills: 1 | OUTPATIENT
Start: 2024-09-09

## 2024-09-09 NOTE — TELEPHONE ENCOUNTER
Patient is needing the Bactroban in an ointment because the Cream is non formulary.  He is currently out as he lost his tube.

## 2024-09-09 NOTE — TELEPHONE ENCOUNTER
Caller: Barry Palmer    Relationship: Self    Best call back number: [phone]     Requested Prescriptions:   Requested Prescriptions     Pending Prescriptions Disp Refills    mupirocin (BACTROBAN) 2 % cream 15 g 1     Sig: Apply 1 Application topically to the appropriate area as directed 2 (Two) Times a Day.        Pharmacy where request should be sent: [id number] - [place], [place] - [address] [facility] [phone] [facility] [phone]      Last office visit with prescribing clinician: 8/14/2024   Last telemedicine visit with prescribing clinician: Visit date not found   Next office visit with prescribing clinician: 12/17/2024     Additional details provided by patient: MISPLACED HIS PRIOR ONE    Does the patient have less than a 3 day supply:  [x] Yes  [] No    Would you like a call back once the refill request has been completed: [] Yes [x] No    If the office needs to give you a call back, can they leave a voicemail: [] Yes [x] No    Talha Leiva Rep   09/09/24 11:25 CDT

## 2024-12-17 ENCOUNTER — OFFICE VISIT (OUTPATIENT)
Dept: INTERNAL MEDICINE | Facility: CLINIC | Age: 74
End: 2024-12-17
Payer: MEDICARE

## 2024-12-17 VITALS
OXYGEN SATURATION: 98 % | BODY MASS INDEX: 28.14 KG/M2 | TEMPERATURE: 96.3 F | HEIGHT: 69 IN | WEIGHT: 190 LBS | SYSTOLIC BLOOD PRESSURE: 140 MMHG | HEART RATE: 83 BPM | DIASTOLIC BLOOD PRESSURE: 78 MMHG

## 2024-12-17 DIAGNOSIS — Z00.00 ENCOUNTER FOR SUBSEQUENT ANNUAL WELLNESS VISIT IN MEDICARE PATIENT: Primary | ICD-10-CM

## 2024-12-17 DIAGNOSIS — I10 ESSENTIAL HYPERTENSION: ICD-10-CM

## 2024-12-17 DIAGNOSIS — Z23 FLU VACCINE NEED: ICD-10-CM

## 2024-12-17 DIAGNOSIS — H61.21 RIGHT EAR IMPACTED CERUMEN: ICD-10-CM

## 2024-12-17 DIAGNOSIS — R09.82 PND (POST-NASAL DRIP): ICD-10-CM

## 2024-12-17 DIAGNOSIS — R41.3 MEMORY IMPAIRMENT: ICD-10-CM

## 2024-12-17 DIAGNOSIS — E78.2 MIXED HYPERLIPIDEMIA: ICD-10-CM

## 2024-12-17 PROCEDURE — 3077F SYST BP >= 140 MM HG: CPT | Performed by: INTERNAL MEDICINE

## 2024-12-17 PROCEDURE — 99214 OFFICE O/P EST MOD 30 MIN: CPT | Performed by: INTERNAL MEDICINE

## 2024-12-17 PROCEDURE — 1125F AMNT PAIN NOTED PAIN PRSNT: CPT | Performed by: INTERNAL MEDICINE

## 2024-12-17 PROCEDURE — G0439 PPPS, SUBSEQ VISIT: HCPCS | Performed by: INTERNAL MEDICINE

## 2024-12-17 PROCEDURE — 1160F RVW MEDS BY RX/DR IN RCRD: CPT | Performed by: INTERNAL MEDICINE

## 2024-12-17 PROCEDURE — 90662 IIV NO PRSV INCREASED AG IM: CPT | Performed by: INTERNAL MEDICINE

## 2024-12-17 PROCEDURE — 1170F FXNL STATUS ASSESSED: CPT | Performed by: INTERNAL MEDICINE

## 2024-12-17 PROCEDURE — 3078F DIAST BP <80 MM HG: CPT | Performed by: INTERNAL MEDICINE

## 2024-12-17 PROCEDURE — 69210 REMOVE IMPACTED EAR WAX UNI: CPT | Performed by: INTERNAL MEDICINE

## 2024-12-17 PROCEDURE — 1159F MED LIST DOCD IN RCRD: CPT | Performed by: INTERNAL MEDICINE

## 2024-12-17 PROCEDURE — G0008 ADMIN INFLUENZA VIRUS VAC: HCPCS | Performed by: INTERNAL MEDICINE

## 2024-12-17 RX ORDER — CETIRIZINE HYDROCHLORIDE 10 MG/1
10 TABLET ORAL DAILY
Qty: 30 TABLET | Refills: 1 | Status: SHIPPED | OUTPATIENT
Start: 2024-12-17

## 2024-12-17 RX ORDER — HYDROCHLOROTHIAZIDE 12.5 MG/1
12.5 TABLET ORAL DAILY
Qty: 90 TABLET | Refills: 1 | Status: SHIPPED | OUTPATIENT
Start: 2024-12-17

## 2024-12-17 RX ORDER — FLUTICASONE PROPIONATE 50 MCG
2 SPRAY, SUSPENSION (ML) NASAL DAILY
Qty: 16 G | Refills: 1 | Status: SHIPPED | OUTPATIENT
Start: 2024-12-17

## 2024-12-17 NOTE — PROGRESS NOTES
Subjective   The ABCs of the Annual Wellness Visit  Medicare Wellness Visit      Barry Palmer is a 74 y.o. patient who presents for a Medicare Wellness Visit.    The following portions of the patient's history were reviewed and   updated as appropriate: allergies, current medications, past family history, past medical history, past social history, past surgical history, and problem list.    Compared to one year ago, the patient's physical   health is the same.  Compared to one year ago, the patient's mental   health is the same.    Recent Hospitalizations:  He was not admitted to the hospital during the last year.     Current Medical Providers:  Patient Care Team:  DOUG Goodson DO as PCP - General (Internal Medicine)  Aquiles Little MD as PCP - Family Medicine    Outpatient Medications Prior to Visit   Medication Sig Dispense Refill    donepezil (ARICEPT) 10 MG tablet Take 1 tablet by mouth Every Night.      multivitamin with minerals tablet tablet Take 1 tablet by mouth Daily.      mupirocin (BACTROBAN) 2 % ointment Apply 1 Application topically to the appropriate area as directed 2 (Two) Times a Day. 30 g 1    Nutritional Supplements (Osteo Advance) tablet Take  by mouth.      Omega-3 Fatty Acids (fish oil) 1000 MG capsule capsule Take 1 capsule by mouth Daily With Breakfast. Obtain OTC.      simvastatin (ZOCOR) 40 MG tablet Take 1 tablet by mouth Every Night.      vitamin D3 125 MCG (5000 UT) capsule capsule Take 1 capsule by mouth Daily.       No facility-administered medications prior to visit.     No opioid medication identified on active medication list. I have reviewed chart for other potential  high risk medication/s and harmful drug interactions in the elderly.      Aspirin is not on active medication list.  Aspirin use is not indicated based on review of current medical condition/s. Risk of harm outweighs potential benefits.  .    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  ACP discussion was held with the patient during this visit. Patient has an advance directive (not in EMR), copy requested.            Objective   Vitals:    12/17/24 0945   BP: 140/78   BP Location: Left arm   Patient Position: Sitting   Cuff Size: Adult   Pulse: 83   Temp: 96.3 °F (35.7 °C)   TempSrc: Temporal   SpO2: 98%   Weight: 86.2 kg (190 lb)   Height: 175.3 cm (69\")       Estimated body mass index is 28.06 kg/m² as calculated from the following:    Height as of this encounter: 175.3 cm (69\").    Weight as of this encounter: 86.2 kg (190 lb).                Does the patient have evidence of cognitive impairment? Yes     Ear Cerumen Removal    Date/Time: 12/17/2024 12:16 PM    Performed by: DOUG Goodson DO  Authorized by: DOUG Goodson DO    Anesthesia:  Local Anesthetic: none  Location details: right ear  Patient tolerance: patient tolerated the procedure well with no immediate complications  Procedure type: instrumentation, curette   Sedation:  Patient sedated: no                                                                                                  Health  Risk Assessment    Smoking Status:  Social History     Tobacco Use   Smoking Status Never   Smokeless Tobacco Never     Alcohol Consumption:  Social History     Substance and Sexual Activity   Alcohol Use Yes    Alcohol/week: 28.0 standard drinks of alcohol    Types: 28 Cans of beer per week    Comment: beer       Fall Risk Screen  STEADI Fall Risk Assessment was completed, and patient is at LOW risk for falls.Assessment completed on:12/17/2024    Depression Screening   Little interest or pleasure in doing things? Not at all   Feeling down, " depressed, or hopeless? Not at all   PHQ-2 Total Score 0      Health Habits and Functional and Cognitive Screenin/17/2024     9:46 AM   Functional & Cognitive Status   Do you have difficulty preparing food and eating? No   Do you have difficulty bathing yourself, getting dressed or grooming yourself? No   Do you have difficulty using the toilet? No   Do you have difficulty moving around from place to place? No   Do you have trouble with steps or getting out of a bed or a chair? No   Current Diet Well Balanced Diet   Dental Exam Up to date   Eye Exam Up to date   Exercise (times per week) 3 times per week   Current Exercises Include Home Exercise Program (TV, Computer, Etc.);Walking   Do you need help using the phone?  No   Are you deaf or do you have serious difficulty hearing?  No   Do you need help to go to places out of walking distance? No   Do you need help shopping? No   Do you need help preparing meals?  No   Do you need help with housework?  No   Do you need help with laundry? No   Do you need help taking your medications? No   Do you need help managing money? No   Do you ever drive or ride in a car without wearing a seat belt? No   Have you felt unusual stress, anger or loneliness in the last month? No   Who do you live with? Spouse   If you need help, do you have trouble finding someone available to you? No   Have you been bothered in the last four weeks by sexual problems? No   Do you have difficulty concentrating, remembering or making decisions? No           Age-appropriate Screening Schedule:  Refer to the list below for future screening recommendations based on patient's age, sex and/or medical conditions. Orders for these recommended tests are listed in the plan section. The patient has been provided with a written plan.    Health Maintenance List  Health Maintenance   Topic Date Due    ZOSTER VACCINE (1 of 2) Never done    RSV Vaccine - Adults (1 - 1-dose 75+ series) Never done    COVID-19  "Vaccine (4 - 2024-25 season) 09/01/2024    LIPID PANEL  06/19/2025    BMI FOLLOWUP  06/19/2025    ANNUAL WELLNESS VISIT  12/17/2025    COLORECTAL CANCER SCREENING  09/03/2029    TDAP/TD VACCINES (2 - Td or Tdap) 08/03/2034    HEPATITIS C SCREENING  Completed    INFLUENZA VACCINE  Completed    Pneumococcal Vaccine 65+  Completed                                                                                                                                                CMS Preventative Services Quick Reference  Risk Factors Identified During Encounter  Immunizations Discussed/Encouraged: Influenza  Dental Screening Recommended  Vision Screening Recommended    The above risks/problems have been discussed with the patient.  Pertinent information has been shared with the patient in the After Visit Summary.  An After Visit Summary and PPPS were made available to the patient.    Follow Up:   Next Medicare Wellness visit to be scheduled in 1 year.         Additional E&M Note during same encounter follows:  Patient has additional, significant, and separately identifiable condition(s)/problem(s) that require work above and beyond the Medicare Wellness Visit     Chief Complaint  Medicare Wellness-subsequent    Subjective   HPI  Barry is also being seen today for an annual adult preventative physical exam.                 Objective   Vital Signs:  /78 (BP Location: Left arm, Patient Position: Sitting, Cuff Size: Adult)   Pulse 83   Temp 96.3 °F (35.7 °C) (Temporal)   Ht 175.3 cm (69\")   Wt 86.2 kg (190 lb)   SpO2 98%   BMI 28.06 kg/m²   Physical Exam  Constitutional:       Comments: Seen and discussed with his wife.   HENT:      Head: Normocephalic and atraumatic.      Right Ear: There is impacted cerumen.      Left Ear: Tympanic membrane and ear canal normal. There is no impacted cerumen.      Mouth/Throat:      Mouth: Mucous membranes are moist.      Pharynx: Oropharynx is clear.   Eyes:      Conjunctiva/sclera: " Conjunctivae normal.      Pupils: Pupils are equal, round, and reactive to light.   Cardiovascular:      Rate and Rhythm: Normal rate and regular rhythm.      Heart sounds: Normal heart sounds.   Pulmonary:      Effort: Pulmonary effort is normal. No respiratory distress.      Breath sounds: Normal breath sounds.   Musculoskeletal:         General: No swelling.      Cervical back: Neck supple.   Lymphadenopathy:      Cervical: No cervical adenopathy.   Skin:     General: Skin is warm and dry.      Findings: No rash.   Neurological:      General: No focal deficit present.      Mental Status: He is alert and oriented to person, place, and time.   Psychiatric:         Mood and Affect: Mood normal.         Behavior: Behavior normal.         Thought Content: Thought content normal.         Judgment: Judgment normal.       Due for lipids and a CMP today.  Last full labs were in June.  They are briefly summarized below.  Most recent PSA and colonoscopy are also stated below.        Assessment and Plan            Encounter for subsequent annual wellness visit in Medicare patient         Memory impairment         Essential hypertension      Orders:    Comprehensive metabolic panel    hydroCHLOROthiazide 12.5 MG tablet; Take 1 tablet by mouth Daily.    Mixed hyperlipidemia       Orders:    Lipid Panel    PND (post-nasal drip)         Right ear impacted cerumen    Orders:    Ear Cerumen Removal    Flu vaccine need    Orders:    Fluzone High-Dose 65+yrs (9882-1530)          Presents today for subsequent Medicare wellness visit as well as follow-up of the below.    Prior to coming to see me, he was started on Aricept by Dr. Little.  He was never formally evaluated by neurology.  He continues to decline a referral.  His wife states that he has done relatively well recently.  She has not seen any major changes.  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  I did some metabolic workup over the summer that was  unremarkable.  He last had neuroimaging in May 2019 with an MRI.  He does not wish to come off of the Aricept.  He also does not wish to have a formal evaluation at this point.    Blood pressure continues to be mildly elevated on repeat visits.  140/78 today.  Feel that we should start him on HCTZ today.  Encouraged self monitoring.  His wife is my patient and she checks her blood pressure on a daily basis.  She states that she will start doing the same for him.  BP log provided.    Need to reassess a lipid panel on simvastatin.    We will also check a CMP with starting HCTZ and being on simvastatin.    He brings up problems with postnasal drip and this is causing somewhat of a dry cough.  He is going to take Zyrtec and Flonase for the time being.  He has no other signs or symptoms that would suggest a bacterial or viral infection at this point in time.    He had a cerumen impaction of the right ear that was cleansed with benefit.    He took the seasonal flu vaccine today.  He took PCV 20 in June.  He does not desire another COVID booster.    Underwent colonoscopy with Dr. Joe on 9/3/2024.  2 polyps (tubular adenoma) removed and hemorrhoids evident.  Repeat in 5 years.    PSA in June normal.     Counseled on appropriate vision and dental screening.  He does wear glasses.  He sees Dr. Harris/Dr. Marin.  Dr. Nguyen is his dentist.    Plan to have him back in 6 months.  He and his wife know that they can reach out sooner if problems.    Follow Up   Return in about 6 months (around 6/17/2025) for Recheck.  Patient was given instructions and counseling regarding his condition or for health maintenance advice. Please see specific information pulled into the AVS if appropriate.

## 2024-12-17 NOTE — ASSESSMENT & PLAN NOTE
Orders:    Comprehensive metabolic panel    hydroCHLOROthiazide 12.5 MG tablet; Take 1 tablet by mouth Daily.

## 2024-12-18 LAB
ALBUMIN SERPL-MCNC: 4.2 G/DL (ref 3.5–5.2)
ALBUMIN/GLOB SERPL: 1.4 G/DL
ALP SERPL-CCNC: 69 U/L (ref 39–117)
ALT SERPL-CCNC: 21 U/L (ref 1–41)
AST SERPL-CCNC: 27 U/L (ref 1–40)
BILIRUB SERPL-MCNC: 0.7 MG/DL (ref 0–1.2)
BUN SERPL-MCNC: 12 MG/DL (ref 8–23)
BUN/CREAT SERPL: 11.5 (ref 7–25)
CALCIUM SERPL-MCNC: 9.3 MG/DL (ref 8.6–10.5)
CHLORIDE SERPL-SCNC: 101 MMOL/L (ref 98–107)
CHOLEST SERPL-MCNC: 173 MG/DL (ref 0–200)
CO2 SERPL-SCNC: 24.6 MMOL/L (ref 22–29)
CREAT SERPL-MCNC: 1.04 MG/DL (ref 0.76–1.27)
EGFRCR SERPLBLD CKD-EPI 2021: 74.4 ML/MIN/1.73
GLOBULIN SER CALC-MCNC: 3 GM/DL
GLUCOSE SERPL-MCNC: 96 MG/DL (ref 65–99)
HDLC SERPL-MCNC: 54 MG/DL (ref 40–60)
LDLC SERPL CALC-MCNC: 67 MG/DL (ref 0–100)
POTASSIUM SERPL-SCNC: 4.7 MMOL/L (ref 3.5–5.2)
PROT SERPL-MCNC: 7.2 G/DL (ref 6–8.5)
SODIUM SERPL-SCNC: 138 MMOL/L (ref 136–145)
TRIGL SERPL-MCNC: 333 MG/DL (ref 0–150)
VLDLC SERPL CALC-MCNC: 52 MG/DL (ref 5–40)

## 2025-02-05 RX ORDER — FLUTICASONE PROPIONATE 50 MCG
2 SPRAY, SUSPENSION (ML) NASAL DAILY
Qty: 16 G | Refills: 1 | Status: SHIPPED | OUTPATIENT
Start: 2025-02-05

## 2025-02-17 ENCOUNTER — OFFICE VISIT (OUTPATIENT)
Dept: INTERNAL MEDICINE | Facility: CLINIC | Age: 75
End: 2025-02-17
Payer: MEDICARE

## 2025-02-17 VITALS
SYSTOLIC BLOOD PRESSURE: 130 MMHG | WEIGHT: 197 LBS | BODY MASS INDEX: 29.18 KG/M2 | DIASTOLIC BLOOD PRESSURE: 76 MMHG | OXYGEN SATURATION: 99 % | TEMPERATURE: 97.5 F | HEART RATE: 84 BPM | HEIGHT: 69 IN

## 2025-02-17 DIAGNOSIS — M79.89 SWELLING OF TOE OF RIGHT FOOT: Primary | ICD-10-CM

## 2025-02-17 PROCEDURE — 1159F MED LIST DOCD IN RCRD: CPT

## 2025-02-17 PROCEDURE — 99213 OFFICE O/P EST LOW 20 MIN: CPT

## 2025-02-17 PROCEDURE — 1160F RVW MEDS BY RX/DR IN RCRD: CPT

## 2025-02-17 PROCEDURE — 1125F AMNT PAIN NOTED PAIN PRSNT: CPT

## 2025-02-17 PROCEDURE — 3078F DIAST BP <80 MM HG: CPT

## 2025-02-17 PROCEDURE — 3075F SYST BP GE 130 - 139MM HG: CPT

## 2025-02-17 RX ORDER — DOXYCYCLINE 100 MG/1
100 CAPSULE ORAL 2 TIMES DAILY
Qty: 14 CAPSULE | Refills: 0 | Status: SHIPPED | OUTPATIENT
Start: 2025-02-17 | End: 2025-02-24

## 2025-02-17 NOTE — PROGRESS NOTES
Subjective   Barry Palmer is a 74 y.o. male.   Chief Complaint   Patient presents with    Toe Pain     Patient c/o toe pain (R third toe) x 4 days.   Experiencing swelling, redness.   States walking or putting on shoes worsens pn.   States sx have occurred before.        History of Present Illness   History of Present Illness  The patient is a 74-year-old male who presents to the office today with concerns of toe pain.    He reports severe pain in the third toe of his right foot for the past 4 days, accompanied by swelling and redness. The pain has been severe enough to hinder his ability to walk or wear shoes. He has experienced similar symptoms in the past, which were evaluated by Dr. Goodson through an x-ray in August, prior to that Jessica JOHNSON in July for same issue. Despite previous antibiotic treatment, the current episode is more severe. He has not experienced any fever. He recalls a minor injury to the toe approximately 5 to 6 years ago, which resolved without complications. He also mentions a possible incident during a football game years ago that may have hyperreflexed the toe. He has not taken any over-the-counter medications for the current episode. He uses an exercise machine that may exert additional pressure on his toes. He maintains an active lifestyle, walking daily at the mall and exercising twice a day. He recently purchased new shoes from the [employer] in [place]. He reports no discharge from his foot. He has not experienced any chest pain or shortness of breath. The pain intensifies with walking or wearing shoes, but subsides when he is seated. He has experienced similar symptoms in the past, which were evaluated by Dr. Goodson through an x-ray. He has no history of diabetes or peripheral arterial disease. He has no history of trauma to the toe. He has no history of blood clots.    MEDICATIONS  Current: Metoprolol, Eliquis, Osteo Bi-Flex       The following portions of the patient's  history were reviewed and updated as appropriate: allergies, current medications, past family history, past medical history, past social history, past surgical history and problem list.    Review of Systems    Objective   Past Medical History:   Diagnosis Date    Cancer     skin cancer     Hypertension       Past Surgical History:   Procedure Laterality Date    ARM LESION/CYST EXCISION Right 04/23/2020    Procedure: WIDE LOCAL EXCISION SQUAMOUS CELL CARCINOMA - RIGHT UPPER EXTREMITY;  Surgeon: Kami Palmer MD;  Location: [facility] OR;  Service: General;  Laterality: Right;    COLONOSCOPY  09/04/2014    Dr. Wise-hyperplastic polyp,diverticula left colon    COLONOSCOPY N/A 9/3/2024    Procedure: COLONOSCOPY WITH ANESTHESIA;  Surgeon: Rolando Wise MD;  Location: [facility] ENDOSCOPY;  Service: Gastroenterology;  Laterality: N/A;  pre: hx polyps  post: polyps. diverticulosis.   Goodson        Current Outpatient Medications:     cetirizine (zyrTEC) 10 MG tablet, Take 1 tablet by mouth Daily., Disp: 30 tablet, Rfl: 1    donepezil (ARICEPT) 10 MG tablet, Take 1 tablet by mouth Every Night., Disp: , Rfl:     fluticasone (FLONASE) 50 MCG/ACT nasal spray, ADMINISTER 2 SPRAYS INTO THE NOSTRIL(S) AS DIRECTED BY PROVIDER DAILY., Disp: 16 g, Rfl: 1    hydroCHLOROthiazide 12.5 MG tablet, Take 1 tablet by mouth Daily., Disp: 90 tablet, Rfl: 1    multivitamin with minerals tablet tablet, Take 1 tablet by mouth Daily., Disp: , Rfl:     mupirocin (BACTROBAN) 2 % ointment, Apply 1 Application topically to the appropriate area as directed 2 (Two) Times a Day., Disp: 30 g, Rfl: 1    Nutritional Supplements (Osteo Advance) tablet, Take  by mouth., Disp: , Rfl:     Omega-3 Fatty Acids (fish oil) 1000 MG capsule capsule, Take 1 capsule by mouth Daily With Breakfast. Obtain OTC., Disp: , Rfl:     simvastatin (ZOCOR) 40 MG tablet, Take 1 tablet by mouth Every Night., Disp: , Rfl:     vitamin D3 125 MCG (5000 UT) capsule capsule, Take 1  "capsule by mouth Daily., Disp: , Rfl:     amoxicillin-clavulanate (AUGMENTIN) 875-125 MG per tablet, Take 1 tablet by mouth 2 (Two) Times a Day for 7 days., Disp: 14 tablet, Rfl: 0    doxycycline (VIBRAMYCIN) 100 MG capsule, Take 1 capsule by mouth 2 (Two) Times a Day for 7 days., Disp: 14 capsule, Rfl: 0      /76 (BP Location: Left arm, Patient Position: Sitting, Cuff Size: Adult)   Pulse 84   Temp 97.5 °F (36.4 °C) (Infrared)   Ht 175.3 cm (69\")   Wt 89.4 kg (197 lb)   SpO2 99%   BMI 29.09 kg/m²      Body mass index is 29.09 kg/m².          Physical Exam  Cardiovascular:      Pulses:           Dorsalis pedis pulses are 2+ on the right side.        Posterior tibial pulses are 2+ on the right side.   Feet:      Right foot:      Skin integrity: Erythema and warmth present. No ulcer, blister or skin breakdown.      Toenail Condition: Right toenails are normal.      Comments: The third toe of the right foot is more starkly swollen any mobile for the rest the foot however there is surrounding erythema, overt warmth radiating, and pain is reported -although notably pain did not increase with light pressure application or passive range of motion.               Assessment & Plan   Diagnoses and all orders for this visit:    1. Swelling of toe of right foot (Primary)  -     doxycycline (VIBRAMYCIN) 100 MG capsule; Take 1 capsule by mouth 2 (Two) Times a Day for 7 days.  Dispense: 14 capsule; Refill: 0  -     amoxicillin-clavulanate (AUGMENTIN) 875-125 MG per tablet; Take 1 tablet by mouth 2 (Two) Times a Day for 7 days.  Dispense: 14 tablet; Refill: 0  -     Uric acid; Future                 Assessment & Plan  1. Pain in the third toe of the right foot.  The patient's vital signs are within normal limits, with no evidence of sepsis. Previous imaging studies, including two x-rays and an ultrasound, have not revealed any significant findings other than arthritic changes. He has a history of mild arthritis in the " "third and fourth toes. His uric acid level was last checked in 2022 and was within the normal range. Given the recurrent nature of his symptoms, a referral to a podiatrist is considered -after reviewing plan of care with the patient and his wife they prefer to initially check his uric acid level in a few weeks once this is completely cleared up.   He is currently taking Osteo Bi-Flex, which contains calcium, and multivitamins. He has been advised to maintain cleanliness, dryness, and integrity of the affected area. The use of an ice pack has been suggested to alleviate swelling and pain. Over-the-counter Tylenol can be used for pain management. He has been instructed to \"clean\" the interior of his shoes using a hair dryer set to high heat for several minutes -he does inform me that his shoes are fairly new and there does not seem to be correlating factor here.   He has been informed that multivitamins and calcium supplements can interfere with the absorption of doxycycline, and thus, should not be taken within the same hour as the antibiotic. A prescription for doxycycline/Augmentin (treatment reportedly worked last time) will be sent to [facility]. If there is no signs of gradual improvement within the next 24 to 48 hours, or if the redness begins to spread, he should contact the office. If his uric acid level is normal, a consultation with a podiatrist will be recommended given 3 x occurrence same foot/toe/site.    Patient or patient representative verbalized consent for the use of Ambient Listening during the visit with  ALEX Miller for chart documentation. 2/17/2025  19:47 CST    Please note that portions of this note were completed with a voice recognition program.     Electronically signed by ALEX Miller, 02/17/25, 19:50 CST.  "

## 2025-02-17 NOTE — TELEPHONE ENCOUNTER
Caller: [facility] - [place], [place] - [address] - [phone] [facility] [phone] FX    Relationship: Pharmacy    Best call back number:  [phone]     Requested Prescriptions:   Requested Prescriptions     Pending Prescriptions Disp Refills    donepezil (ARICEPT) 10 MG tablet       Sig: Take 1 tablet by mouth Every Night.        Pharmacy where request should be sent: [facility] - [place], [place] - [address] - [phone] [facility] [phone] FX     Last office visit with prescribing clinician: 12/17/2024   Last telemedicine visit with prescribing clinician: Visit date not found   Next office visit with prescribing clinician: 6/24/2025     Additional details provided by patient:      Does the patient have less than a 3 day supply:  [x] Yes  [] No    Would you like a call back once the refill request has been completed: [] Yes [x] No    If the office needs to give you a call back, can they leave a voicemail: [] Yes [x] No    Talha Delatorre   02/17/25 16:06 CST          ”

## 2025-02-18 RX ORDER — DONEPEZIL HYDROCHLORIDE 10 MG/1
10 TABLET, FILM COATED ORAL NIGHTLY
Qty: 90 TABLET | Refills: 0 | Status: SHIPPED | OUTPATIENT
Start: 2025-02-18

## 2025-02-24 ENCOUNTER — OFFICE VISIT (OUTPATIENT)
Dept: INTERNAL MEDICINE | Facility: CLINIC | Age: 75
End: 2025-02-24
Payer: MEDICARE

## 2025-02-24 VITALS
DIASTOLIC BLOOD PRESSURE: 62 MMHG | TEMPERATURE: 96.8 F | BODY MASS INDEX: 29.47 KG/M2 | OXYGEN SATURATION: 98 % | HEIGHT: 69 IN | SYSTOLIC BLOOD PRESSURE: 130 MMHG | HEART RATE: 75 BPM | WEIGHT: 199 LBS

## 2025-02-24 DIAGNOSIS — L53.9 ERYTHEMA OF LOWER EXTREMITY: ICD-10-CM

## 2025-02-24 DIAGNOSIS — M79.89 SWELLING OF TOE OF RIGHT FOOT: Primary | ICD-10-CM

## 2025-02-24 DIAGNOSIS — Z09 ENCOUNTER FOR FOLLOW-UP EXAMINATION AFTER COMPLETED TREATMENT FOR CONDITIONS OTHER THAN MALIGNANT NEOPLASM: ICD-10-CM

## 2025-02-24 PROCEDURE — 1159F MED LIST DOCD IN RCRD: CPT

## 2025-02-24 PROCEDURE — 99213 OFFICE O/P EST LOW 20 MIN: CPT

## 2025-02-24 PROCEDURE — G2211 COMPLEX E/M VISIT ADD ON: HCPCS

## 2025-02-24 PROCEDURE — 3078F DIAST BP <80 MM HG: CPT

## 2025-02-24 PROCEDURE — 1125F AMNT PAIN NOTED PAIN PRSNT: CPT

## 2025-02-24 PROCEDURE — 3075F SYST BP GE 130 - 139MM HG: CPT

## 2025-02-24 PROCEDURE — 1160F RVW MEDS BY RX/DR IN RCRD: CPT

## 2025-02-24 NOTE — PROGRESS NOTES
"Chief Complaint  Foot Swelling (Right foot swelling and pain.  Was seen last week and finished his medicine this morning. )    Subjective        Barry Palmer presents to [facility] PRIMARY CARE  History of Present Illness  Foot Swelling (Right foot swelling and pain.  Was seen last week and finished his medicine this morning. )      Objective   Vital Signs:  /62 (BP Location: Left arm, Patient Position: Sitting, Cuff Size: Adult)   Pulse 75   Temp 96.8 °F (36 °C) (Temporal)   Ht 175.3 cm (69\")   Wt 90.3 kg (199 lb)   SpO2 98%   BMI 29.39 kg/m²   Estimated body mass index is 29.39 kg/m² as calculated from the following:    Height as of this encounter: 175.3 cm (69\").    Weight as of this encounter: 90.3 kg (199 lb).        Physical Exam  Vitals and nursing note reviewed.   Constitutional:       General: He is not in acute distress.     Appearance: Normal appearance. He is not ill-appearing, toxic-appearing or diaphoretic.   HENT:      Head: Normocephalic and atraumatic.      Right Ear: External ear normal.      Left Ear: External ear normal.      Nose: Nose normal.   Eyes:      Conjunctiva/sclera: Conjunctivae normal.   Cardiovascular:      Rate and Rhythm: Normal rate and regular rhythm.      Pulses: Normal pulses.      Heart sounds: Normal heart sounds.   Pulmonary:      Effort: Pulmonary effort is normal.      Breath sounds: Normal breath sounds.   Musculoskeletal:        Feet:    Feet:      Right foot:      Skin integrity: Skin breakdown, erythema and dry skin present.      Comments: Erythema and swelling to right third toe as above, mild erythema to foot and generally to the right lower extremity, no edema, good pulses and cap refill.  Small area of skin breakdown noted to the lateral side of the toe, appears to be an area of dry sloughing skin.  Skin:     General: Skin is warm and dry.      Capillary Refill: Capillary refill takes less than 2 seconds.   Neurological:      Mental " Status: He is alert. Mental status is at baseline.        Result Review :           Office Visit with Monserrat Wiggins APRN (02/17/2025)            Assessment and Plan   Diagnoses and all orders for this visit:    1. Swelling of toe of right foot (Primary)  -     US Ankle / Brachial Indices Extremity Complete; Future  -     Ambulatory Referral to Podiatry    2. Erythema of lower extremity  -     US Ankle / Brachial Indices Extremity Complete; Future  -     Ambulatory Referral to Podiatry    3. Encounter for follow-up examination after completed treatment for conditions other than malignant neoplasm  -     US Ankle / Brachial Indices Extremity Complete; Future      Patient presents with his wife and is seen today complaining of continued erythema and swelling to the 3rd toe of the right foot. This has been ongoing for quite some time now; wife reports the patient has history of this issue occurring intermittently. Was occurring with his previous PCP as well. He was seen last week and was treated with Augmentin and Doxycycline which he has now finished. He has a uric acid level pending which his wife states they were told not to get drawn until resolution of symptoms. The patient has had several normal Xrs and US to date. His PCP's last note recommends podiatry referral as the next step, they would like to move forward with this. I will place referral. On exam today the 3rd toe of the right foot is red and swollen, although patient denies pain. There is an area to the lateral aspect of the toe that appears as dry sloughing skin. The entire extremity is mildly erythemic as well with good pulses and good cap refill. ROM is intact. I have recommended ABIs as well as this appears to maybe have some arterial involvement.     Plan:  ABIs pending  Podiatry referral  Follow up as needed         Follow Up   Return if symptoms worsen or fail to improve.  Patient was given instructions and counseling regarding his condition  or for health maintenance advice. Please see specific information pulled into the AVS if appropriate.

## 2025-03-17 NOTE — PROGRESS NOTES
[facility] - PODIATRY    Today's Date: 03/18/2025     Patient Name: Barry Palmer  MRN: [id number]  CSN: [id number]  PCP: DOUG Goodson DO  Referring Provider: Zohreh Pearson APRN    SUBJECTIVE     Chief Complaint   Patient presents with    [facility]     DOUG Goodson DO 02/24/25 -Pain and swelling of toe, right, Erythema of lower extremity - XR 8.14.24-pt states he is here today for pain and swelling right 3rd toe-pt denies pain     HPI: Barry Palmer, a 74 y.o.male, comes to clinic as a(n) new patient complaining of foot pain. Patient has h/o skin cancer, HTN .  States that for the past 1-2 months he has been dealing with redness and swelling to his right 3rd toe. Notes jamming the toe many years ago but no recent injury. Denies recent imaging.  States that he has had off and on similar issues in the past. Has previously been given abx due to concern for infection.  Denies being worked up for gout.  Admits pain at 3/10 level and described as aching and dull. Relates previous treatment(s) including as noted . Admits to daily alcohol use. Denies any constitutional symptoms. No other pedal complaints at this time.    Past Medical History:   Diagnosis Date    Cancer     skin cancer     Hypertension      Past Surgical History:   Procedure Laterality Date    ARM LESION/CYST EXCISION Right 04/23/2020    Procedure: WIDE LOCAL EXCISION SQUAMOUS CELL CARCINOMA - RIGHT UPPER EXTREMITY;  Surgeon: Kami Palmer MD;  Location: [facility] OR;  Service: General;  Laterality: Right;    COLONOSCOPY  09/04/2014    Dr. Wise-hyperplastic polyp,diverticula left colon    COLONOSCOPY N/A 9/3/2024    Procedure: COLONOSCOPY WITH ANESTHESIA;  Surgeon: Rolando Wise MD;  Location: [facility] ENDOSCOPY;  Service: Gastroenterology;  Laterality: N/A;  pre: hx polyps  post: polyps. diverticulosis.   Kellee     Family History   Problem Relation Age of Onset    Colon cancer Neg Hx     Colon polyps  Neg Hx      Social History     Socioeconomic History    Marital status:    Tobacco Use    Smoking status: Never     Passive exposure: Never    Smokeless tobacco: Never   Vaping Use    Vaping status: Never Used   Substance and Sexual Activity    Alcohol use: Yes     Alcohol/week: 28.0 standard drinks of alcohol     Types: 28 Cans of beer per week     Comment: beer    Drug use: Never    Sexual activity: Defer     No Known Allergies  Current Outpatient Medications   Medication Sig Dispense Refill    donepezil (ARICEPT) 10 MG tablet Take 1 tablet by mouth Every Night. 90 tablet 0    multivitamin with minerals tablet tablet Take 1 tablet by mouth Daily.      Nutritional Supplements (Osteo Advance) tablet Take  by mouth.      Omega-3 Fatty Acids (fish oil) 1000 MG capsule capsule Take 1 capsule by mouth Daily With Breakfast. Obtain OTC.      simvastatin (ZOCOR) 40 MG tablet Take 1 tablet by mouth Every Night.      vitamin D3 125 MCG (5000 UT) capsule capsule Take 1 capsule by mouth Daily.       No current facility-administered medications for this visit.     Review of Systems   Constitutional:  Negative for chills and fever.   HENT:  Negative for congestion.    Respiratory:  Negative for shortness of breath.    Cardiovascular:  Negative for chest pain and leg swelling.   Gastrointestinal:  Negative for constipation, diarrhea, nausea and vomiting.   Musculoskeletal:  Positive for arthralgias.        Foot pain   Skin:  Negative for wound.   Neurological:  Negative for numbness.       OBJECTIVE     Vitals:    03/18/25 1406   BP: 120/60   Pulse: 72   SpO2: 97%       PHYSICAL EXAM  GEN:   Accompanied by wife.     Foot/Ankle Exam    GENERAL  Appearance:  appears stated age  Orientation:  AAOx3  Affect:  appropriate  Gait:  unimpaired  Assistance:  independent  Right shoe gear: casual shoe  Left shoe gear: casual shoe    VASCULAR     Right Foot Vascularity   Dorsalis pedis:  2+  Posterior tibial:  2+  Skin temperature:   warm  Edema grading:  None  CFT:  3  Pedal hair growth:  Present  Varicosities:  none     Left Foot Vascularity   Dorsalis pedis:  2+  Posterior tibial:  2+  Skin temperature:  warm  Edema grading:  None  CFT:  3  Pedal hair growth:  Present  Varicosities:  none     NEUROLOGIC     Right Foot Neurologic   Normal sensation    Light touch sensation: normal  Vibratory sensation: normal  Hot/Cold sensation: normal     Left Foot Neurologic   Normal sensation    Light touch sensation: normal  Vibratory sensation: normal  Hot/Cold sensation:  normal    MUSCULOSKELETAL     Right Foot Musculoskeletal   Ecchymosis:  none  Tenderness:  toe 3 tenderness    Arch:  Normal     Left Foot Musculoskeletal   Ecchymosis:  none  Tenderness:  none  Arch:  Normal  Tailor's bunion: Yes      MUSCLE STRENGTH     Right Foot Muscle Strength   Foot dorsiflexion:  5  Foot plantar flexion:  5  Foot inversion:  5  Foot eversion:  5     Left Foot Muscle Strength   Foot dorsiflexion:  5  Foot plantar flexion:  5  Foot inversion:  5  Foot eversion:  5    RANGE OF MOTION     Right Foot Range of Motion   Foot and ankle ROM within normal limits       Left Foot Range of Motion   Foot and ankle ROM within normal limits      DERMATOLOGIC      Right Foot Dermatologic   Skin  Right foot skin is intact.      Left Foot Dermatologic   Skin  Left foot skin is intact.     Image:       RADIOLOGY/NUCLEAR:  No results found.    LABORATORY/CULTURE RESULTS:      PATHOLOGY RESULTS:       ASSESSMENT/PLAN     Diagnoses and all orders for this visit:    1. Acute gout involving toe of right foot, unspecified cause (Primary)  -     XR Foot 3+ View Right; Future  -     Uric Acid; Future  -     Surgical Pathology Exam; Future    2. Foot pain, right  -     XR Foot 3+ View Right; Future  -     Uric Acid; Future  -     Surgical Pathology Exam; Future      Comprehensive lower extremity examination and evaluation was performed.  Discussed findings and treatment plan including  risks, benefits, and treatment options with patient in detail. Patient agreed with treatment plan.  Clinically, findings consistent with acute/chronic gout of the right 3rd toe.   Small amount of white material was expressed from the site and will be sent to pathology for analysis.  Will order uric acid for further assessment of gout.  X-ray ordered for analysis of bony structure.  An After Visit Summary was printed and given to the patient at discharge, including (if requested) any available informative/educational handouts regarding diagnosis, treatment, or medications. All questions were answered to patient/family satisfaction. Should symptoms fail to improve or worsen they agree to call or return to clinic or to go to the Emergency Department. Discussed the importance of following up with any needed screening tests/labs/specialist appointments and any requested follow-up recommended by me today. Importance of maintaining follow-up discussed and patient accepts that missed appointments can delay diagnosis and potentially lead to worsening of conditions.  Return in about 3 weeks (around 4/8/2025) for Recheck, Follow-up with Dr. Diaz., or sooner if acute issues arise.      This document has been electronically signed by Donaldo Diaz DPM on March 18, 2025 14:40 CDT

## 2025-03-18 ENCOUNTER — OFFICE VISIT (OUTPATIENT)
Age: 75
End: 2025-03-18
Payer: MEDICARE

## 2025-03-18 ENCOUNTER — HOSPITAL ENCOUNTER (OUTPATIENT)
Dept: GENERAL RADIOLOGY | Facility: HOSPITAL | Age: 75
Discharge: HOME | End: 2025-03-18
Payer: MEDICARE

## 2025-03-18 ENCOUNTER — LAB (OUTPATIENT)
Dept: LAB | Facility: HOSPITAL | Age: 75
End: 2025-03-18
Payer: MEDICARE

## 2025-03-18 VITALS
HEART RATE: 72 BPM | WEIGHT: 200 LBS | OXYGEN SATURATION: 97 % | BODY MASS INDEX: 29.62 KG/M2 | SYSTOLIC BLOOD PRESSURE: 120 MMHG | HEIGHT: 69 IN | DIASTOLIC BLOOD PRESSURE: 60 MMHG

## 2025-03-18 DIAGNOSIS — M79.671 FOOT PAIN, RIGHT: ICD-10-CM

## 2025-03-18 DIAGNOSIS — M10.9 ACUTE GOUT INVOLVING TOE OF RIGHT FOOT, UNSPECIFIED CAUSE: ICD-10-CM

## 2025-03-18 DIAGNOSIS — M10.9 ACUTE GOUT INVOLVING TOE OF RIGHT FOOT, UNSPECIFIED CAUSE: Primary | ICD-10-CM

## 2025-03-18 LAB — URATE SERPL-MCNC: 7.5 MG/DL (ref 3.4–7)

## 2025-03-18 PROCEDURE — 84550 ASSAY OF BLOOD/URIC ACID: CPT

## 2025-03-18 PROCEDURE — 73630 X-RAY EXAM OF FOOT: CPT

## 2025-03-18 PROCEDURE — 36415 COLL VENOUS BLD VENIPUNCTURE: CPT

## 2025-03-20 ENCOUNTER — RESULTS FOLLOW-UP (OUTPATIENT)
Age: 75
End: 2025-03-20
Payer: MEDICARE

## 2025-03-26 ENCOUNTER — OFFICE VISIT (OUTPATIENT)
Dept: INTERNAL MEDICINE | Facility: CLINIC | Age: 75
End: 2025-03-26
Payer: MEDICARE

## 2025-03-26 VITALS
OXYGEN SATURATION: 98 % | DIASTOLIC BLOOD PRESSURE: 68 MMHG | WEIGHT: 200 LBS | HEART RATE: 105 BPM | BODY MASS INDEX: 29.62 KG/M2 | HEIGHT: 69 IN | TEMPERATURE: 97 F | SYSTOLIC BLOOD PRESSURE: 160 MMHG

## 2025-03-26 DIAGNOSIS — L53.8 MACULAR ERYTHEMATOUS RASH: Primary | ICD-10-CM

## 2025-03-26 RX ORDER — TRIAMCINOLONE ACETONIDE 5 MG/G
1 CREAM TOPICAL 2 TIMES DAILY
Qty: 15 G | Refills: 1 | Status: SHIPPED | OUTPATIENT
Start: 2025-03-26

## 2025-03-26 RX ORDER — TRIAMCINOLONE ACETONIDE 40 MG/ML
40 INJECTION, SUSPENSION INTRA-ARTICULAR; INTRAMUSCULAR ONCE
Status: COMPLETED | OUTPATIENT
Start: 2025-03-26 | End: 2025-03-26

## 2025-03-26 RX ADMIN — TRIAMCINOLONE ACETONIDE 40 MG: 40 INJECTION, SUSPENSION INTRA-ARTICULAR; INTRAMUSCULAR at 13:42

## 2025-03-26 NOTE — PROGRESS NOTES
"    Chief Complaint  Rash (Bilateral arms x 1 week.  Dry and itchy. )    Subjective        Barry Palmer presents to [facility] PRIMARY [facility]ARE  Rash    See below.     Objective   Vital Signs:  /68   Pulse 105   Temp 97 °F (36.1 °C) (Skin)   Ht 175.3 cm (69\")   Wt 90.7 kg (200 lb)   SpO2 98%   BMI 29.53 kg/m²   Estimated body mass index is 29.53 kg/m² as calculated from the following:    Height as of this encounter: 175.3 cm (69\").    Weight as of this encounter: 90.7 kg (200 lb).         Physical Exam  Constitutional:       General: He is not in acute distress.     Appearance: He is not ill-appearing.   HENT:      Head: Normocephalic and atraumatic.   Eyes:      Conjunctiva/sclera: Conjunctivae normal.      Pupils: Pupils are equal, round, and reactive to light.   Pulmonary:      Effort: Pulmonary effort is normal. No respiratory distress.   Musculoskeletal:         General: No swelling.   Skin:     General: Skin is warm and dry.      Findings: Rash present.   Neurological:      General: No focal deficit present.      Mental Status: He is alert and oriented to person, place, and time.   Psychiatric:         Mood and Affect: Mood normal.         Behavior: Behavior normal.         Thought Content: Thought content normal.         Judgment: Judgment normal.             Result Review :  Nothing new to review.          Assessment and Plan   Diagnoses and all orders for this visit:    1. Macular erythematous rash (Primary)  -     triamcinolone acetonide (KENALOG-40) injection 40 mg  -     triamcinolone (KENALOG) 0.5 % cream; Apply 1 Application topically to the appropriate area as directed 2 (Two) Times a Day.  Dispense: 15 g; Refill: 1       Presents today for problem visit.    He has noticed a rash recently.  He does not claim any new exposures to medications, detergents, etc.  He has not been working outside.    Most of the rashes in the flexural area of his bilateral upper extremities.  See " "the photo above.  He also has a bit of rash on the chest and back.  Plan to give him a dose of Kenalog today.  Plan to let him use triamcinolone cream.    He will keep regular follow-up in June, but can reach out sooner if problems.    Also point out to him that his blood pressure is elevated today.  However, he states that he checks at home with his wife and that his blood pressure has \"been perfect.\"      Follow Up   No follow-ups on file.  Patient was given instructions and counseling regarding his condition or for health maintenance advice. Please see specific information pulled into the AVS if appropriate.      SARAH Goodson DO       Electronically signed by DOUG Goodson DO, 03/26/25, 1:11 PM CDT.  "

## 2025-04-08 ENCOUNTER — OFFICE VISIT (OUTPATIENT)
Age: 75
End: 2025-04-08
Payer: MEDICARE

## 2025-04-08 VITALS
HEIGHT: 69 IN | HEART RATE: 81 BPM | WEIGHT: 196 LBS | BODY MASS INDEX: 29.03 KG/M2 | OXYGEN SATURATION: 97 % | SYSTOLIC BLOOD PRESSURE: 136 MMHG | DIASTOLIC BLOOD PRESSURE: 70 MMHG

## 2025-04-08 DIAGNOSIS — M79.671 FOOT PAIN, RIGHT: ICD-10-CM

## 2025-04-08 DIAGNOSIS — M10.9 ACUTE GOUT INVOLVING TOE OF RIGHT FOOT, UNSPECIFIED CAUSE: Primary | ICD-10-CM

## 2025-04-08 RX ORDER — ALLOPURINOL 100 MG/1
100 TABLET ORAL DAILY
Qty: 90 TABLET | Refills: 3 | Status: SHIPPED | OUTPATIENT
Start: 2025-04-08

## 2025-04-08 RX ORDER — RAMIPRIL 2.5 MG/1
2.5 CAPSULE ORAL DAILY
COMMUNITY

## 2025-04-08 NOTE — PROGRESS NOTES
[facility] - PODIATRY    Today's Date: 04/08/2025     Patient Name: Barry Palmer  MRN: [id number]  CSN: [id number]  PCP: DOUG Goodson DO  Referring Provider: No ref. provider found    SUBJECTIVE     Chief Complaint   Patient presents with    Follow-up     DOUG Goodson DO-02/24/2025-Return in about 3 weeks/Gout/uric 7.5/xray 3/18-pt states he is here today to go over xray results and uric acid levels-pt denies pain     HPI: Barry Palmer, a 74 y.o.male, comes to clinic as a(n) established patient complaining of foot pain. Patient has h/o skin cancer, HTN .  States that for the past 1-2 months he has been dealing with redness and swelling to his right 3rd toe. Notes jamming the toe many years ago but no recent injury..  Has had updated xrays and uric acid performed. Pathology sample was too small for analysis.  Denies pain. Relates previous treatment(s) including as noted . Admits to daily alcohol use. Denies any constitutional symptoms. No other pedal complaints at this time.    Past Medical History:   Diagnosis Date    Cancer     skin cancer     Hypertension      Past Surgical History:   Procedure Laterality Date    ARM LESION/CYST EXCISION Right 04/23/2020    Procedure: WIDE LOCAL EXCISION SQUAMOUS CELL CARCINOMA - RIGHT UPPER EXTREMITY;  Surgeon: Kami Palmer MD;  Location: [facility] OR;  Service: General;  Laterality: Right;    COLONOSCOPY  09/04/2014    Dr. Joe-hyperplastic polyp,diverticula left colon    COLONOSCOPY N/A 9/3/2024    Procedure: COLONOSCOPY WITH ANESTHESIA;  Surgeon: Rolando Joe MD;  Location: [facility] ENDOSCOPY;  Service: Gastroenterology;  Laterality: N/A;  pre: hx polyps  post: polyps. diverticulosis.   Kellee     Family History   Problem Relation Age of Onset    Colon cancer Neg Hx     Colon polyps Neg Hx      Social History     Socioeconomic History    Marital status:    Tobacco Use    Smoking status: Never     Passive exposure: Never     Smokeless tobacco: Never   Vaping Use    Vaping status: Never Used   Substance and Sexual Activity    Alcohol use: Yes     Alcohol/week: 28.0 standard drinks of alcohol     Types: 28 Cans of beer per week     Comment: beer    Drug use: Never    Sexual activity: Defer     No Known Allergies  Current Outpatient Medications   Medication Sig Dispense Refill    donepezil (ARICEPT) 10 MG tablet Take 1 tablet by mouth Every Night. 90 tablet 0    multivitamin with minerals tablet tablet Take 1 tablet by mouth Daily.      Nutritional Supplements (Osteo Advance) tablet Take  by mouth.      Omega-3 Fatty Acids (fish oil) 1000 MG capsule capsule Take 1 capsule by mouth Daily With Breakfast. Obtain OTC.      ramipril (ALTACE) 2.5 MG capsule Take 1 capsule by mouth Daily.      simvastatin (ZOCOR) 40 MG tablet Take 1 tablet by mouth Every Night.      triamcinolone (KENALOG) 0.5 % cream Apply 1 Application topically to the appropriate area as directed 2 (Two) Times a Day. 15 g 1    vitamin D3 125 MCG (5000 UT) capsule capsule Take 1 capsule by mouth Daily.      allopurinol (ZYLOPRIM) 100 MG tablet Take 1 tablet by mouth Daily. 90 tablet 3     No current facility-administered medications for this visit.     Review of Systems   Constitutional:  Negative for chills and fever.   HENT:  Negative for congestion.    Respiratory:  Negative for shortness of breath.    Cardiovascular:  Negative for chest pain and leg swelling.   Gastrointestinal:  Negative for constipation, diarrhea, nausea and vomiting.   Musculoskeletal:  Positive for arthralgias.        Foot pain   Skin:  Negative for wound.   Neurological:  Negative for numbness.       OBJECTIVE     Vitals:    04/08/25 1306   BP: 136/70   Pulse: 81   SpO2: 97%         PHYSICAL EXAM  GEN:   Accompanied by wife.     Foot/Ankle Exam    GENERAL  Appearance:  appears stated age  Orientation:  AAOx3  Affect:  appropriate  Gait:  unimpaired  Assistance:  independent  Right shoe gear: casual  shoe  Left shoe gear: casual shoe    VASCULAR     Right Foot Vascularity   Dorsalis pedis:  2+  Posterior tibial:  2+  Skin temperature:  warm  Edema grading:  None  CFT:  3  Pedal hair growth:  Present  Varicosities:  none     Left Foot Vascularity   Dorsalis pedis:  2+  Posterior tibial:  2+  Skin temperature:  warm  Edema grading:  None  CFT:  3  Pedal hair growth:  Present  Varicosities:  none     NEUROLOGIC     Right Foot Neurologic   Normal sensation    Light touch sensation: normal  Vibratory sensation: normal  Hot/Cold sensation: normal     Left Foot Neurologic   Normal sensation    Light touch sensation: normal  Vibratory sensation: normal  Hot/Cold sensation:  normal    MUSCULOSKELETAL     Right Foot Musculoskeletal   Ecchymosis:  none  Tenderness:  toe 3 tenderness    Arch:  Normal     Left Foot Musculoskeletal   Ecchymosis:  none  Tenderness:  none  Arch:  Normal  Tailor's bunion: Yes      MUSCLE STRENGTH     Right Foot Muscle Strength   Foot dorsiflexion:  5  Foot plantar flexion:  5  Foot inversion:  5  Foot eversion:  5     Left Foot Muscle Strength   Foot dorsiflexion:  5  Foot plantar flexion:  5  Foot inversion:  5  Foot eversion:  5    RANGE OF MOTION     Right Foot Range of Motion   Foot and ankle ROM within normal limits       Left Foot Range of Motion   Foot and ankle ROM within normal limits      DERMATOLOGIC      Right Foot Dermatologic   Skin  Right foot skin is intact.      Left Foot Dermatologic   Skin  Left foot skin is intact.     Image:       RADIOLOGY/NUCLEAR:  XR Foot 3+ View Right  Result Date: 3/18/2025  Narrative: EXAMINATION:  XR FOOT 3+ VW RIGHT-  3/18/2025 2:10 PM  HISTORY: Third toe pain. M10.9-Gout, unspecified; M79.671-Pain in right foot.  COMPARISON: 8/14/2024.  TECHNIQUE: 3 weightbearing views were obtained.  FINDINGS:  There is no fracture or dislocation. There is mild narrowing of some of the interphalangeal joint spaces of the toes, most severe at the third toe PIP  joint. There appears to be mild soft tissue swelling of the third toe. Joint spaces are otherwise well maintained. There is pes planus deformity on the lateral weightbearing image.       Impression: 1. No evidence of acute fracture. 2. Mild narrowing of some of the interphalangeal joint spaces of the toes, most severe at the third toe PIP joint. 3. Soft tissue swelling of the third toe. 4. Pes planus deformity.   This report was signed and finalized on 3/18/2025 4:35 PM by Dr. Waletr Adan MD.        LABORATORY/CULTURE RESULTS:      PATHOLOGY RESULTS:       ASSESSMENT/PLAN     Diagnoses and all orders for this visit:    1. Acute gout involving toe of right foot, unspecified cause (Primary)  -     Uric Acid; Future    2. Foot pain, right    Other orders  -     allopurinol (ZYLOPRIM) 100 MG tablet; Take 1 tablet by mouth Daily.  Dispense: 90 tablet; Refill: 3        Comprehensive lower extremity examination and evaluation was performed.  Discussed findings and treatment plan including risks, benefits, and treatment options with patient in detail. Patient agreed with treatment plan.  Clinically, findings consistent with acute/chronic gout of the right 3rd toe.   Xrays reviewed with patient.   Uric acid elevated.  Start on allopurinol.   Recheck uric acid in 3 months.  An After Visit Summary was printed and given to the patient at discharge, including (if requested) any available informative/educational handouts regarding diagnosis, treatment, or medications. All questions were answered to patient/family satisfaction. Should symptoms fail to improve or worsen they agree to call or return to clinic or to go to the Emergency Department. Discussed the importance of following up with any needed screening tests/labs/specialist appointments and any requested follow-up recommended by me today. Importance of maintaining follow-up discussed and patient accepts that missed appointments can delay diagnosis and potentially lead  to worsening of conditions.  Return in about 3 months (around 7/8/2025) for Follow-up with Podiatry Provider, Recheck., or sooner if acute issues arise.      This document has been electronically signed by Donaldo Diaz DPM on April 8, 2025 13:23 CDT

## 2025-04-14 ENCOUNTER — OFFICE VISIT (OUTPATIENT)
Dept: INTERNAL MEDICINE | Facility: CLINIC | Age: 75
End: 2025-04-14
Payer: MEDICARE

## 2025-04-14 VITALS
DIASTOLIC BLOOD PRESSURE: 74 MMHG | OXYGEN SATURATION: 95 % | HEART RATE: 75 BPM | RESPIRATION RATE: 18 BRPM | WEIGHT: 196 LBS | SYSTOLIC BLOOD PRESSURE: 136 MMHG | TEMPERATURE: 98 F | BODY MASS INDEX: 29.03 KG/M2 | HEIGHT: 69 IN

## 2025-04-14 DIAGNOSIS — S60.464A INSECT BITE OF RIGHT RING FINGER, INITIAL ENCOUNTER: Primary | ICD-10-CM
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
## 2025-04-14 RX ORDER — TACROLIMUS 1 MG/G
1 OINTMENT TOPICAL 2 TIMES DAILY
COMMUNITY
Start: 2025-04-07

## 2025-04-14 RX ORDER — DOXYCYCLINE 100 MG/1
100 CAPSULE ORAL 2 TIMES DAILY
Qty: 20 CAPSULE | Refills: 0 | Status: SHIPPED | OUTPATIENT
Start: 2025-04-14 | End: 2025-04-24

## 2025-04-14 NOTE — PROGRESS NOTES
"Chief Complaint  Insect Bite (Patient states he was cleaning out a closet and felt pain in his ring finger on his right hand that day and now the area is swollen and red with pain when touched. )    Subjective    History of Present Illness      Patient presents to [facility] for      History of Present Illness  The patient is a 75-year-old male who presents for evaluation of a bite on his right hand.    He reports a bite on his right hand, specifically on the ring finger, which occurred approximately 3 to 4 days ago. The area is tender to touch but not warm. He admits to picking at the wound. He does not report any systemic symptoms such as fever.    ALLERGIES  The patient has no known allergies.    Review of Systems   Skin:  Positive for skin lesions.   All other systems reviewed and are negative.    I have reviewed and agree with the HPI and ROS information as above.  ALEX Oswald     Objective   Vital Signs:   /74 (BP Location: Left arm, Patient Position: Sitting, Cuff Size: Adult)   Pulse 75   Temp 98 °F (36.7 °C) (Infrared)   Resp 18   Ht 175.3 cm (69\")   Wt 88.9 kg (196 lb)   SpO2 95%   BMI 28.94 kg/m²        Physical Exam  Vitals and nursing note reviewed.   Constitutional:       Appearance: Normal appearance.   Cardiovascular:      Rate and Rhythm: Normal rate and regular rhythm.      Heart sounds: Normal heart sounds.   Pulmonary:      Effort: Pulmonary effort is normal.      Breath sounds: Normal breath sounds.   Skin:     Findings: Lesion present.      Comments: On right hand, ring finger. Infected.    Neurological:      Mental Status: He is alert and oriented to person, place, and time. Mental status is at baseline.   Psychiatric:         Mood and Affect: Mood normal.         Behavior: Behavior normal.         Thought Content: Thought content normal.         Judgment: Judgment normal.              Assessment and Plan    Assessment & Plan  1. Bite wound on " the right hand.  The wound appears to be infected. He is advised to avoid picking at the wound and to maintain cleanliness. A prescription for doxycycline 100 mg, to be taken twice daily for 10 days, has been issued to [facility] If there is no improvement or if the condition worsens, characterized by streaking up the hand or systemic symptoms, he should inform us immediately.     Diagnoses and all orders for this visit:    1. Insect bite of right ring finger, initial encounter (Primary)  -     doxycycline (VIBRAMYCIN) 100 MG capsule; Take 1 capsule by mouth 2 (Two) Times a Day for 10 days.  Dispense: 20 capsule; Refill: 0        Follow Up   Return if symptoms worsen or fail to improve.  Patient was given instructions and counseling regarding his condition or for health maintenance advice. Please see specific information pulled into the AVS if appropriate.

## 2025-06-24 NOTE — PROGRESS NOTES
[facility] - PODIATRY    Today's Date: 07/08/2025     Patient Name: Barry Palmer  MRN: [id number]  CSN: [id number]  PCP: DOUG Goodson DO  Referring Provider: No ref. provider found    SUBJECTIVE     Chief Complaint   Patient presents with    Follow-up     DOUG Goodson DO-03/26/2025-3 month fu with uric labs. Uric acid labs not completed.      HPI: Barry Palmer, a 75 y.o.male, comes to clinic as a(n) established patient complaining of foot pain. Patient has h/o skin cancer, HTN. States that his right 3rd toe continues to improve. Has minimal redness or swelling. Has not had updated uric acid yet. Notes pain to the outside of his left foot.   Admits pain at 3/10 level and described as aching and dull. Relates previous treatment(s) including as noted. Admits to daily alcohol use. Denies any constitutional symptoms. No other pedal complaints at this time.    Past Medical History:   Diagnosis Date    Cancer     skin cancer     Hypertension      Past Surgical History:   Procedure Laterality Date    ARM LESION/CYST EXCISION Right 04/23/2020    Procedure: WIDE LOCAL EXCISION SQUAMOUS CELL CARCINOMA - RIGHT UPPER EXTREMITY;  Surgeon: Kami Palmer MD;  Location: [facility] OR;  Service: General;  Laterality: Right;    COLONOSCOPY  09/04/2014    Dr. Joe-hyperplastic polyp,diverticula left colon    COLONOSCOPY N/A 9/3/2024    Procedure: COLONOSCOPY WITH ANESTHESIA;  Surgeon: Rolando Joe MD;  Location: [facility] ENDOSCOPY;  Service: Gastroenterology;  Laterality: N/A;  pre: hx polyps  post: polyps. diverticulosis.   Kellee     Family History   Problem Relation Age of Onset    Colon cancer Neg Hx     Colon polyps Neg Hx      Social History     Socioeconomic History    Marital status:    Tobacco Use    Smoking status: Never     Passive exposure: Never    Smokeless tobacco: Never   Vaping Use    Vaping status: Never Used   Substance and Sexual Activity    Alcohol use: Yes      Alcohol/week: 28.0 standard drinks of alcohol     Types: 28 Cans of beer per week     Comment: beer    Drug use: Never    Sexual activity: Defer     No Known Allergies  Current Outpatient Medications   Medication Sig Dispense Refill    allopurinol (ZYLOPRIM) 100 MG tablet Take 1 tablet by mouth Daily. 90 tablet 3    donepezil (ARICEPT) 10 MG tablet Take 1 tablet by mouth Every Night. 90 tablet 0    multivitamin with minerals tablet tablet Take 1 tablet by mouth Daily.      Nutritional Supplements (Osteo Advance) tablet Take  by mouth.      Omega-3 Fatty Acids (fish oil) 1000 MG capsule capsule Take 1 capsule by mouth Daily With Breakfast. Obtain OTC.      ramipril (ALTACE) 2.5 MG capsule Take 1 capsule by mouth Daily.      simvastatin (ZOCOR) 40 MG tablet Take 1 tablet by mouth Every Night.      tacrolimus (PROTOPIC) 0.1 % ointment 1 Application 2 (Two) Times a Day.      triamcinolone (KENALOG) 0.5 % cream Apply 1 Application topically to the appropriate area as directed 2 (Two) Times a Day. 15 g 1    vitamin D3 125 MCG (5000 UT) capsule capsule Take 1 capsule by mouth Daily.       No current facility-administered medications for this visit.     Review of Systems   Constitutional:  Negative for chills and fever.   HENT:  Negative for congestion.    Respiratory:  Negative for shortness of breath.    Cardiovascular:  Negative for chest pain and leg swelling.   Gastrointestinal:  Negative for constipation, diarrhea, nausea and vomiting.   Musculoskeletal:  Positive for arthralgias.        Foot pain   Skin:  Negative for wound.   Neurological:  Negative for numbness.       OBJECTIVE     Vitals:    07/08/25 1302   BP: 152/82   Pulse: 77   SpO2: 97%           PHYSICAL EXAM  GEN:   Accompanied by wife.     Foot/Ankle Exam    GENERAL  Appearance:  appears stated age  Orientation:  AAOx3  Affect:  appropriate  Gait:  unimpaired  Assistance:  independent  Right shoe gear: casual shoe  Left shoe gear: casual  shoe    VASCULAR     Right Foot Vascularity   Dorsalis pedis:  2+  Posterior tibial:  2+  Skin temperature:  warm  Edema grading:  None  CFT:  3  Pedal hair growth:  Present  Varicosities:  none     Left Foot Vascularity   Dorsalis pedis:  2+  Posterior tibial:  2+  Skin temperature:  warm  Edema grading:  None  CFT:  3  Pedal hair growth:  Present  Varicosities:  none     NEUROLOGIC     Right Foot Neurologic   Normal sensation    Light touch sensation: normal  Vibratory sensation: normal  Hot/Cold sensation: normal     Left Foot Neurologic   Normal sensation    Light touch sensation: normal  Vibratory sensation: normal  Hot/Cold sensation:  normal    MUSCULOSKELETAL     Right Foot Musculoskeletal   Ecchymosis:  none  Tenderness:  toe 3 tenderness    Arch:  Normal     Left Foot Musculoskeletal   Ecchymosis:  none  Tenderness:  MTP 5 dorsal tenderness  Arch:  Normal  Tailor's bunion: Yes      MUSCLE STRENGTH     Right Foot Muscle Strength   Foot dorsiflexion:  5  Foot plantar flexion:  5  Foot inversion:  5  Foot eversion:  5     Left Foot Muscle Strength   Foot dorsiflexion:  5  Foot plantar flexion:  5  Foot inversion:  5  Foot eversion:  5    RANGE OF MOTION     Right Foot Range of Motion   Foot and ankle ROM within normal limits       Left Foot Range of Motion   Foot and ankle ROM within normal limits      DERMATOLOGIC      Right Foot Dermatologic   Skin  Right foot skin is intact.      Left Foot Dermatologic   Skin  Left foot skin is intact.       RADIOLOGY/NUCLEAR:  No results found.      LABORATORY/CULTURE RESULTS:      PATHOLOGY RESULTS:       ASSESSMENT/PLAN     Diagnoses and all orders for this visit:    1. Acute gout involving toe of right foot, unspecified cause (Primary)    2. Foot pain, right    3. Tailor's bunion of left foot  -     XR Foot 3+ View Left; Future    4. Foot pain, left          Comprehensive lower extremity examination and evaluation was performed.  Discussed findings and treatment plan  including risks, benefits, and treatment options with patient in detail. Patient agreed with treatment plan.  Clinically, findings consistent with acute/chronic gout of the right 3rd toe.   Uric acid lab to be performed  Continue current dose of allopurinol.   Dispensed bunion shield.   Left foot xray ordered.  An After Visit Summary was printed and given to the patient at discharge, including (if requested) any available informative/educational handouts regarding diagnosis, treatment, or medications. All questions were answered to patient/family satisfaction. Should symptoms fail to improve or worsen they agree to call or return to clinic or to go to the Emergency Department. Discussed the importance of following up with any needed screening tests/labs/specialist appointments and any requested follow-up recommended by me today. Importance of maintaining follow-up discussed and patient accepts that missed appointments can delay diagnosis and potentially lead to worsening of conditions.  Return in about 1 month (around 8/8/2025) for Follow-up with Dr. Diaz., or sooner if acute issues arise.      This document has been electronically signed by Donaldo Diaz DPM on July 8, 2025 13:18 CDT

## 2025-07-08 ENCOUNTER — HOSPITAL ENCOUNTER (OUTPATIENT)
Dept: GENERAL RADIOLOGY | Facility: HOSPITAL | Age: 75
Discharge: HOME | End: 2025-07-08
Payer: MEDICARE

## 2025-07-08 ENCOUNTER — OFFICE VISIT (OUTPATIENT)
Age: 75
End: 2025-07-08
Payer: MEDICARE

## 2025-07-08 ENCOUNTER — LAB (OUTPATIENT)
Dept: LAB | Facility: HOSPITAL | Age: 75
End: 2025-07-08
Payer: MEDICARE

## 2025-07-08 VITALS
DIASTOLIC BLOOD PRESSURE: 82 MMHG | HEIGHT: 69 IN | HEART RATE: 77 BPM | SYSTOLIC BLOOD PRESSURE: 152 MMHG | BODY MASS INDEX: 28.29 KG/M2 | OXYGEN SATURATION: 97 % | WEIGHT: 191 LBS

## 2025-07-08 DIAGNOSIS — M21.622 TAILOR'S BUNION OF LEFT FOOT: ICD-10-CM

## 2025-07-08 DIAGNOSIS — M79.672 FOOT PAIN, LEFT: ICD-10-CM

## 2025-07-08 DIAGNOSIS — M79.671 FOOT PAIN, RIGHT: ICD-10-CM

## 2025-07-08 DIAGNOSIS — M10.9 ACUTE GOUT INVOLVING TOE OF RIGHT FOOT, UNSPECIFIED CAUSE: Primary | ICD-10-CM

## 2025-07-08 DIAGNOSIS — M10.9 ACUTE GOUT INVOLVING TOE OF RIGHT FOOT, UNSPECIFIED CAUSE: ICD-10-CM

## 2025-07-08 PROCEDURE — 84550 ASSAY OF BLOOD/URIC ACID: CPT

## 2025-07-08 PROCEDURE — 73630 X-RAY EXAM OF FOOT: CPT

## 2025-07-11 ENCOUNTER — RESULTS FOLLOW-UP (OUTPATIENT)
Age: 75
End: 2025-07-11
Payer: MEDICARE

## 2025-07-15 ENCOUNTER — OFFICE VISIT (OUTPATIENT)
Dept: INTERNAL MEDICINE | Facility: CLINIC | Age: 75
End: 2025-07-15
Payer: MEDICARE

## 2025-07-15 VITALS
WEIGHT: 191 LBS | TEMPERATURE: 97.1 F | SYSTOLIC BLOOD PRESSURE: 140 MMHG | BODY MASS INDEX: 28.29 KG/M2 | OXYGEN SATURATION: 98 % | HEIGHT: 69 IN | HEART RATE: 77 BPM | DIASTOLIC BLOOD PRESSURE: 68 MMHG

## 2025-07-15 DIAGNOSIS — I10 ESSENTIAL HYPERTENSION: ICD-10-CM

## 2025-07-15 DIAGNOSIS — R41.3 MEMORY IMPAIRMENT: ICD-10-CM

## 2025-07-15 DIAGNOSIS — E78.2 MIXED HYPERLIPIDEMIA: Primary | ICD-10-CM

## 2025-07-15 PROCEDURE — 3077F SYST BP >= 140 MM HG: CPT | Performed by: INTERNAL MEDICINE

## 2025-07-15 PROCEDURE — 3078F DIAST BP <80 MM HG: CPT | Performed by: INTERNAL MEDICINE

## 2025-07-15 PROCEDURE — 1160F RVW MEDS BY RX/DR IN RCRD: CPT | Performed by: INTERNAL MEDICINE

## 2025-07-15 PROCEDURE — G2211 COMPLEX E/M VISIT ADD ON: HCPCS | Performed by: INTERNAL MEDICINE

## 2025-07-15 PROCEDURE — 99214 OFFICE O/P EST MOD 30 MIN: CPT | Performed by: INTERNAL MEDICINE

## 2025-07-15 PROCEDURE — 1125F AMNT PAIN NOTED PAIN PRSNT: CPT | Performed by: INTERNAL MEDICINE

## 2025-07-15 PROCEDURE — 1159F MED LIST DOCD IN RCRD: CPT | Performed by: INTERNAL MEDICINE

## 2025-07-15 RX ORDER — RAMIPRIL 10 MG/1
10 CAPSULE ORAL DAILY
Start: 2025-07-15

## 2025-07-15 NOTE — PROGRESS NOTES
"    Chief Complaint  Follow-up (6 month follow up. Medicare wellness due 12/17/2025)    Subjective        Barry Palmer presents to [facility]  History of Present Illness  See below.     Objective   Vital Signs:  /68   Pulse 77   Temp 97.1 °F (36.2 °C) (Temporal)   Ht 175.3 cm (69.02\")   Wt 86.6 kg (191 lb)   SpO2 98%   BMI 28.19 kg/m²   Estimated body mass index is 28.19 kg/m² as calculated from the following:    Height as of this encounter: 175.3 cm (69.02\").    Weight as of this encounter: 86.6 kg (191 lb).     BMI is >= 25 and <30. (Overweight) The following options were offered after discussion;: weight loss educational material (shared in after visit summary)    Physical Exam  Constitutional:       Comments: Seen and discussed with his wife.   HENT:      Head: Normocephalic and atraumatic.   Eyes:      Conjunctiva/sclera: Conjunctivae normal.      Pupils: Pupils are equal, round, and reactive to light.   Cardiovascular:      Rate and Rhythm: Normal rate and regular rhythm.      Heart sounds: Normal heart sounds.   Pulmonary:      Effort: Pulmonary effort is normal. No respiratory distress.      Breath sounds: Normal breath sounds.   Musculoskeletal:         General: No swelling.   Skin:     General: Skin is warm and dry.      Findings: No rash.   Neurological:      General: No focal deficit present.      Mental Status: He is alert and oriented to person, place, and time.        Result Review :  Prior memory impairment workup mentioned below.    His last full labs were in June 2024.  He had lipids and CMP in December.  We are going to link his other labs to his annual physical in December.  He needs lipids and CMP repeated today.         Assessment and Plan   Diagnoses and all orders for this visit:    1. Mixed hyperlipidemia (Primary)  -     Lipid Panel  -     Comprehensive metabolic panel    2. Essential hypertension  -     ramipril (ALTACE) 10 MG capsule; Take 1 " capsule by mouth Daily.    3. Memory impairment       Presents today for follow-up.    He is accompanied by his wife.  She brought in bottles of all prescription medications and supplements today.  We went through them one by one and discussed the necessity and instructions again.    His blood pressure has been slightly elevated on office visits recently.  They claim that it runs better in the home setting.  He is on 10 mg of ramipril daily.  I discussed increasing therapy, but he and his wife wish to hold off on that for now.    Needs lipids reassessed on simvastatin, which he tolerates.    There has been quite a bit of back-and-forth on his memory impairment since I started seeing him after Dr. Little's detention.  He himself does not wish to be evaluated by neurology.  His wife wishes that he would.  Dr. Little started him on Aricept a few years ago without having him evaluated by neurology.  He did have an MRI in May 2019 that showed age-related changes and atrophy.  I conducted some metabolic and serologic studies in the summer 2024 that were unrevealing.  He only has a few tablets of Aricept left as we went through his medications in detail today.  They have gone back and forth on whether or not to interrupt this medication to see if he actually really needs it.  They are more convicted in this today.  She is going to keep a close eye on the patient with going off of the medication.  I also take care of their son who is very close with the patient.  He comes back for a follow-up next month in the a have given permission to check in with him in the past as well.    He is due back in December for his annual Medicare wellness visit.  He and his wife know that they can reach out sooner if problems.  She can report to me on Monday at her visit on how he is doing and I will also check on them next Friday by phone prior to leaving town for a week.      Follow Up   Return in about 5 months (around 12/29/2025) for Annual  Medicare Wellness Visit.  Patient was given instructions and counseling regarding his condition or for health maintenance advice. Please see specific information pulled into the AVS if appropriate.      SARAH Goodson DO       Electronically signed by DOUG Goodson DO, 07/15/25, 11:34 AM CDT.

## 2025-07-16 LAB
ALBUMIN SERPL-MCNC: 4.6 G/DL (ref 3.5–5.2)
ALBUMIN/GLOB SERPL: 1.9 G/DL
ALP SERPL-CCNC: 66 U/L (ref 39–117)
ALT SERPL-CCNC: 18 U/L (ref 1–41)
AST SERPL-CCNC: 26 U/L (ref 1–40)
BILIRUB SERPL-MCNC: 0.7 MG/DL (ref 0–1.2)
BUN SERPL-MCNC: 12 MG/DL (ref 8–23)
BUN/CREAT SERPL: 10.9 (ref 7–25)
CALCIUM SERPL-MCNC: 9.5 MG/DL (ref 8.6–10.5)
CHLORIDE SERPL-SCNC: 104 MMOL/L (ref 98–107)
CHOLEST SERPL-MCNC: 161 MG/DL (ref 0–200)
CO2 SERPL-SCNC: 25 MMOL/L (ref 22–29)
CREAT SERPL-MCNC: 1.1 MG/DL (ref 0.76–1.27)
EGFRCR SERPLBLD CKD-EPI 2021: 69.1 ML/MIN/1.73
GLOBULIN SER CALC-MCNC: 2.4 GM/DL
GLUCOSE SERPL-MCNC: 87 MG/DL (ref 65–99)
HDLC SERPL-MCNC: 61 MG/DL (ref 40–60)
LDLC SERPL CALC-MCNC: 73 MG/DL (ref 0–100)
POTASSIUM SERPL-SCNC: 4.4 MMOL/L (ref 3.5–5.2)
PROT SERPL-MCNC: 7 G/DL (ref 6–8.5)
SODIUM SERPL-SCNC: 138 MMOL/L (ref 136–145)
TRIGL SERPL-MCNC: 162 MG/DL (ref 0–150)
VLDLC SERPL CALC-MCNC: 27 MG/DL (ref 5–40)

## 2025-08-08 ENCOUNTER — OFFICE VISIT (OUTPATIENT)
Dept: INTERNAL MEDICINE | Facility: CLINIC | Age: 75
End: 2025-08-08
Payer: MEDICARE

## 2025-08-08 ENCOUNTER — OFFICE VISIT (OUTPATIENT)
Age: 75
End: 2025-08-08
Payer: MEDICARE

## 2025-08-08 VITALS
DIASTOLIC BLOOD PRESSURE: 62 MMHG | WEIGHT: 191 LBS | SYSTOLIC BLOOD PRESSURE: 110 MMHG | BODY MASS INDEX: 28.29 KG/M2 | HEART RATE: 77 BPM | OXYGEN SATURATION: 98 % | HEIGHT: 69 IN

## 2025-08-08 VITALS
BODY MASS INDEX: 28.71 KG/M2 | WEIGHT: 193.8 LBS | SYSTOLIC BLOOD PRESSURE: 120 MMHG | TEMPERATURE: 96 F | OXYGEN SATURATION: 97 % | HEIGHT: 69 IN | HEART RATE: 81 BPM | DIASTOLIC BLOOD PRESSURE: 70 MMHG

## 2025-08-08 DIAGNOSIS — M10.9 ACUTE GOUT INVOLVING TOE OF RIGHT FOOT, UNSPECIFIED CAUSE: ICD-10-CM

## 2025-08-08 DIAGNOSIS — R21 FACIAL RASH: Primary | ICD-10-CM

## 2025-08-08 DIAGNOSIS — M79.671 FOOT PAIN, RIGHT: ICD-10-CM

## 2025-08-08 DIAGNOSIS — M21.622 TAILOR'S BUNION OF LEFT FOOT: Primary | ICD-10-CM

## 2025-08-08 DIAGNOSIS — M79.672 FOOT PAIN, LEFT: ICD-10-CM

## 2025-08-08 RX ORDER — TRIAMCINOLONE ACETONIDE 40 MG/ML
40 INJECTION, SUSPENSION INTRA-ARTICULAR; INTRAMUSCULAR ONCE
Status: COMPLETED | OUTPATIENT
Start: 2025-08-08 | End: 2025-08-08

## 2025-08-08 RX ORDER — DIAPER,BRIEF,INFANT-TODD,DISP
1 EACH MISCELLANEOUS 2 TIMES DAILY
Qty: 15 G | Refills: 0 | Status: SHIPPED | OUTPATIENT
Start: 2025-08-08

## 2025-08-08 RX ADMIN — TRIAMCINOLONE ACETONIDE 40 MG: 40 INJECTION, SUSPENSION INTRA-ARTICULAR; INTRAMUSCULAR at 14:33

## 2025-08-19 RX ORDER — SIMVASTATIN 40 MG
40 TABLET ORAL NIGHTLY
Qty: 90 TABLET | Refills: 1 | Status: SHIPPED | OUTPATIENT
Start: 2025-08-19

## (undated) DEVICE — Device: Brand: DEFENDO AIR/WATER/SUCTION AND BIOPSY VALVE

## (undated) DEVICE — ANTIBACTERIAL UNDYED BRAIDED (POLYGLACTIN 910), SYNTHETIC ABSORBABLE SUTURE: Brand: COATED VICRYL

## (undated) DEVICE — YANKAUER,BULB TIP WITH VENT: Brand: ARGYLE

## (undated) DEVICE — THE SINGLE USE ETRAP – POLYP TRAP IS USED FOR SUCTION RETRIEVAL OF ENDOSCOPICALLY REMOVED POLYPS.: Brand: ETRAP

## (undated) DEVICE — GLV SURG BIOGEL LTX PF 6 1/2

## (undated) DEVICE — PAD MINOR UNIVERSAL: Brand: MEDLINE INDUSTRIES, INC.

## (undated) DEVICE — ELECTRD BLD EDGE/INSUL1P 2.4X5.1MM STRL

## (undated) DEVICE — TRY PREP SCRB VAG PVP

## (undated) DEVICE — MASK,OXYGEN,MED CONC,ADLT,7' TUB, UC: Brand: PENDING

## (undated) DEVICE — PK TURNOVER RM ADV

## (undated) DEVICE — THE CHANNEL CLEANING BRUSH IS A NYLON FLEXI BRUSH ATTACHED TO A FLEXIBLE PLASTIC SHEATH DESIGNED TO SAFELY REMOVE DEBRIS FROM FLEXIBLE ENDOSCOPES.

## (undated) DEVICE — SUT ETHLN 3/0 FS1 30IN 669H

## (undated) DEVICE — SENSR O2 OXIMAX FNGR A/ 18IN NONSTR

## (undated) DEVICE — BNDG ELAS CO-FLEX SLF ADHR 4IN5YD LF STRL

## (undated) DEVICE — ENDOCUFF VISION PRP SM 10.4

## (undated) DEVICE — Device: Brand: SINGLE USE ELECTROSURGICAL SNARE SD-400

## (undated) DEVICE — CUFF,BP,DISP,1 TUBE,ADULT,HP: Brand: MEDLINE

## (undated) DEVICE — STCKNT IMPERV 9X36IN STRL